# Patient Record
Sex: MALE | Race: WHITE | NOT HISPANIC OR LATINO | Employment: UNEMPLOYED | URBAN - METROPOLITAN AREA
[De-identification: names, ages, dates, MRNs, and addresses within clinical notes are randomized per-mention and may not be internally consistent; named-entity substitution may affect disease eponyms.]

---

## 2017-06-06 RX ORDER — CITALOPRAM 20 MG/1
TABLET, FILM COATED ORAL
Qty: 90 TABLET | Refills: 0 | Status: SHIPPED | OUTPATIENT
Start: 2017-06-06 | End: 2018-01-03

## 2017-06-07 ENCOUNTER — TELEPHONE (OUTPATIENT)
Dept: FAMILY MEDICINE | Facility: CLINIC | Age: 52
End: 2017-06-07

## 2017-06-07 NOTE — TELEPHONE ENCOUNTER
Upon review, pt has not been seen since 4/4/16. As of 7/22/16 pt's wife was informed that we do not accept new Medicaid(See phone note) and she stated understanding and will find a new provider. Also, Dr Alejandra approved the Citalopram on 6/5/17 for a one time refill, indicating the pt needs an appt. Msg was left by nurse for pt to call back and schedule appt. Will confirm that it is OK to continue seeing pt even if he has Medicaid. Also, pt's Involvement in Care form is a [facility] form. Awaiting response from mgmt to find out if we can honor this, or if we need to get pt to sign an [facility] Involvement in Care. Will call pt/wife once we know who we can communicate with.

## 2017-06-07 NOTE — TELEPHONE ENCOUNTER
----- Message from Lynnette Allen sent at 6/7/2017 12:23 PM CDT -----  Wife (Danielle)calling nurse concerning refill of patient's medication: Citalopram 20 mg/stated that [facility] informed them that patient needs to speak with doctor/no refills/please call back at [phone] (cellphone)to advise. (patient is out of medication)

## 2017-06-13 NOTE — TELEPHONE ENCOUNTER
----- Message from Madeline Caldwell sent at 6/13/2017  8:28 AM CDT -----  Patient is returning office call. Please call back with details at [phone].

## 2017-06-13 NOTE — TELEPHONE ENCOUNTER
According to our mgmt, the [facility] Involvement in Care form cannot be honored by [facility]. Called and left msg at number below for pt to call back. Will discuss in detail the refill protocol and options for PCP/appt.

## 2017-10-03 DIAGNOSIS — F41.9 ANXIETY: ICD-10-CM

## 2017-10-03 RX ORDER — CITALOPRAM 20 MG/1
TABLET, FILM COATED ORAL
Qty: 90 TABLET | Refills: 0 | OUTPATIENT
Start: 2017-10-03

## 2017-10-03 RX ORDER — ALPRAZOLAM 0.5 MG/1
TABLET ORAL
Qty: 30 TABLET | Refills: 0 | OUTPATIENT
Start: 2017-10-03

## 2017-10-04 ENCOUNTER — TELEPHONE (OUTPATIENT)
Dept: FAMILY MEDICINE | Facility: CLINIC | Age: 52
End: 2017-10-04

## 2017-10-04 NOTE — TELEPHONE ENCOUNTER
Unable to refill pt meds. Pt has been advised via previous refill requests that he needs appt. They have been advised that the visit would cost around $150(01099). Spoke w/ pt's wife and just advised her that pt needs appt prior to any refills due to time lapse since last visit. She will have pt call for appt. She is aware pt needs [facility] form signed in order to discuss any care w/ anyone other than pt.

## 2017-10-04 NOTE — TELEPHONE ENCOUNTER
----- Message from Nayeli Marquez sent at 10/4/2017 10:00 AM CDT -----  Contact: Wife, Jessica Rebolledo, wife of patient Jcarlos Rebolledo, [phone] is calling regarding refill on alprazolam (XANAX) 0.5 MG tablet AND citalopram (CELEXA) 20 MG tablet.  Patient understand that Dr. Alejandra wants to see him, but patient has no insurance at this time, but will have insurance beginning January 1, 2018.  Patient would like to know if Rx can be refilled through the end of the year until he has insurance again and can make an appointment.  Please advise.  Thanks!

## 2017-11-03 DIAGNOSIS — Z12.11 COLON CANCER SCREENING: ICD-10-CM

## 2017-11-06 ENCOUNTER — PATIENT OUTREACH (OUTPATIENT)
Dept: ADMINISTRATIVE | Facility: HOSPITAL | Age: 52
End: 2017-11-06

## 2017-11-06 NOTE — LETTER
November 6, 2017    Jcarlos Marksughman  [address]  [place] [address]             [facility]  [address]  [place] [address]  Phone: [phone] Dear Mr. Rebolledo:    [facility] is committed to your overall health.  To help you get the most out of each of your visits, we will review your information to make sure you are up to date on all of your recommended tests and/or procedures.      Dr. Evan Alejandra has found that your chart shows you may be due for hepatitis C screening, colon cancer screening, and a flu immunization.     If you have had any of the above done at another facility, please bring the records or information with you so that your record at [facility] will be complete.  If you would like to schedule any of these, please contact me.    If you are currently taking medication, please bring it with you to your appointment for review.    If you have any questions or concerns, please don't hesitate to call.    Thank you for letting us care for you,  Isabel Parker LPN Clinical Care Coordinator  [facility] [place]  [phone]

## 2017-12-20 ENCOUNTER — PATIENT OUTREACH (OUTPATIENT)
Dept: ADMINISTRATIVE | Facility: HOSPITAL | Age: 52
End: 2017-12-20

## 2017-12-20 NOTE — LETTER
December 20, 2017    Jcarlos Marksughman  [address]  [place] [address]             [facility]  [address]  [place] [address]  Phone: [phone] Dear Mr. Rebolledo:    [facility] is committed to your overall health.  To help you get the most out of each of your visits, we will review your information to make sure you are up to date on all of your recommended tests and/or procedures.      Dr. Alejandra has found that your chart shows you may be due for One time Hepatitis C screening lab test ( a viral condition that harms the liver), colon cancer screening, flu vaccine.     If you have had any of the above done at another facility, please bring the records or information with you so that your record at [facility] will be complete.  If you would like to schedule any of these, please contact me.    If you are currently taking medication, please bring it with you to your appointment for review.          If you have any questions or concerns, please don't hesitate to call.    Sincerely,        Herbert Bell LPN Clinical Care Coordinator  [facility] [address] [facility] [address]  [place] [address]  [phone] (p)   [phone] (f)

## 2018-01-03 ENCOUNTER — OFFICE VISIT (OUTPATIENT)
Dept: FAMILY MEDICINE | Facility: CLINIC | Age: 53
End: 2018-01-03
Payer: COMMERCIAL

## 2018-01-03 VITALS
HEART RATE: 70 BPM | WEIGHT: 222 LBS | TEMPERATURE: 98 F | BODY MASS INDEX: 31.08 KG/M2 | HEIGHT: 71 IN | DIASTOLIC BLOOD PRESSURE: 88 MMHG | SYSTOLIC BLOOD PRESSURE: 160 MMHG

## 2018-01-03 DIAGNOSIS — F41.9 ANXIETY: Primary | ICD-10-CM

## 2018-01-03 DIAGNOSIS — I10 ESSENTIAL HYPERTENSION: ICD-10-CM

## 2018-01-03 DIAGNOSIS — S49.92XA INJURY OF LEFT SHOULDER, INITIAL ENCOUNTER: ICD-10-CM

## 2018-01-03 PROCEDURE — 99214 OFFICE O/P EST MOD 30 MIN: CPT | Mod: S$GLB,,, | Performed by: FAMILY MEDICINE

## 2018-01-03 PROCEDURE — 99999 PR PBB SHADOW E&M-EST. PATIENT-LVL IV: CPT | Mod: PBBFAC,,, | Performed by: FAMILY MEDICINE

## 2018-01-03 RX ORDER — ALPRAZOLAM 0.5 MG/1
0.5 TABLET ORAL NIGHTLY PRN
Qty: 30 TABLET | Refills: 2 | Status: SHIPPED | OUTPATIENT
Start: 2018-01-03 | End: 2018-05-04 | Stop reason: SDUPTHER

## 2018-01-03 NOTE — PATIENT INSTRUCTIONS
Please call [phone] to schedule an appointment with Orthopedics.    Measure blood pressure weekly. Write down the readings. Decrease salt and calories. Eat foods that are high in potassium.   Eating Heart-Healthy Food: Using the DASH Plan    Eating for your heart doesnt have to be hard or boring. You just need to know how to make healthier choices. The DASH eating plan has been developed to help you do just that. DASH stands for Dietary Approaches to Stop Hypertension. It is a plan that has been proven to be healthier for your heart and to lower your risk for high blood pressure. It can also help lower your risk for cancer, heart disease, osteoporosis, and diabetes.  Choosing from each food group  Choose foods from each of the food groups below each day. Try to get the recommended number of servings for each food group. The serving numbers are based on a diet of 2,000 calories a day. Talk to your doctor if youre unsure about your calorie needs. Along with getting the correct servings, the DASH plan also recommends a sodium intake less than 2,300 mg per day.        Grains  Servings: 6 to 8 a day  A serving is:  · 1 slice bread  · 1 ounce dry cereal  · Half a cup cooked rice, pasta or cereal  Best choices: Whole grains and any grains high in fiber. Vegetables  Servings: 4 to 5 a day  A serving is:  · 1 cup raw leafy vegetable  · Half a cup cut-up raw or cooked vegetable  · Half a cup vegetable juice  Best choices: Fresh or frozen vegetables prepared without added salt or fat.   Fruits  Servings: 4 to 5 a day  A serving is:  · 1 medium fruit  · One-quarter cup dried fruit  · Half a cup fresh, frozen, or canned fruit  · Half a cup of 100% fruit juices  Best choices: A variety of fresh fruits of different colors. Whole fruits are a better choice than fruit juices. Low-fat or fat-free dairy  Servings: 2 to 3 a day  A serving is:  · 1 cup milk  · 1 cup yogurt  · One and a half ounces cheese  Best choices: Skim or 1%  milk, low-fat or fat-free yogurt or buttermilk, and low-fat cheeses.         Lean meats, poultry, fish  Servings: 6 or fewer a day  A serving is:  · 1 ounce cooked meats, poultry, or fish  · 1 egg  Best choices: Lean poultry and fish. Trim away visible fat. Broil, grill, roast, or boil instead of frying. Remove skin from poultry before eating. Limit how much red meat you eat.  Nuts, seeds, beans  Servings: 4 to 5 a week  A serving is:  · One-third cup nuts (one and a half ounces)  · 2 tablespoons nut butter or seeds  · Half a cup cooked dry beans or legumes  Best choices: Dry roasted nuts with no salt added, lentils, kidney beans, garbanzo beans, and whole aguilar beans.   Fats and oils  Servings: 2 to 3 a day  A serving is:  · 1 teaspoon vegetable oil  · 1 teaspoon soft margarine  · 1 tablespoon mayonnaise  · 2 tablespoons salad dressing  Best choices: Nut and vegetable oils (nontropical vegetable oils), such as olive and canola oil. Sweets  Servings: 5 a week or fewer  A serving is:  · 1 tablespoon sugar, maple syrup, or honey  · 1 tablespoon jam or jelly  · 1 half-ounce jelly beans (about 15)  · 1 cup lemonade  Best choices: Dried fruit can be a satisfying sweet. Choose low-fat sweets. And watch your serving sizes!      For more on the DASH eating plan, visit:  www.nhlbi.nih.gov/health/health-topics/topics/dash   Date Last Reviewed: 6/1/2016  © 4163-7568 [employer]. [address] [place] [address], [place] [address]. All rights reserved. This information is not intended as a substitute for professional medical care. Always follow your healthcare professional's instructions.

## 2018-01-03 NOTE — PROGRESS NOTES
"Subjective:       Patient ID: Jcarlos Rebolledo is a 52 y.o. male.    Chief Complaint: Follow-up (Discuss med refills. Pt has not taken Celexa 40mg nor Xanax 0.5mg in over 6 mo.)    He is here for follow-up of anxiety.  He has been taking Celexa off and on since [place].  He had tapered off one or 2 years ago.  He resumed temporarily in June 2017 during the stress of a job change.  He was more tran and irritable.  Currently he feels that his emotions are in good control.  He likes to take Xanax 1-2 times a week for insomnia.  He sometimes works the night shift.  It is hard for him to turn off his mind.  He has had some elevated blood pressure readings over the past few years.  He says that his blood pressure has been better than this at home.  He does consume a lot of salty food.  2-3 alcoholic beverages every few weeks.  He did take a sinus medication yesterday.  One to 2 cups of coffee a day.  No family history of hypertension        Review of Systems   Constitutional: Negative for fatigue, fever and unexpected weight change.   HENT: Negative.    Eyes: Negative for visual disturbance.   Respiratory: Negative for cough, shortness of breath and wheezing.    Cardiovascular: Negative for chest pain, palpitations and leg swelling.   Gastrointestinal: Negative for abdominal pain, blood in stool and diarrhea.   Genitourinary: Negative for difficulty urinating and hematuria.   Musculoskeletal:        Left shoulder pain.  Painful to lay on the left side.  He fell backwards and braced himself with his arms about 6 weeks ago.  Painful to raise his arm past horizontal.   Skin:        No neoplasms    Neurological: Negative for weakness and numbness.       Objective:     Blood pressure (!) 160/88, pulse 70, temperature 98.1 °F (36.7 °C), temperature source Oral, height 5' 11" (1.803 m), weight 100.7 kg (222 lb 0.1 oz).      Physical Exam   Constitutional: He appears well-developed and well-nourished.   No distress   HENT: "   Nose clear. TM's wnl. No oral lesions.    Eyes: Conjunctivae and EOM are normal. Pupils are equal, round, and reactive to light.   Neck: Normal range of motion. No thyromegaly present.   Cardiovascular: Normal rate, regular rhythm, normal heart sounds and intact distal pulses.    No murmur heard.  No carotid bruits   Pulmonary/Chest: Effort normal and breath sounds normal. He has no wheezes. He has no rales.   Abdominal: Soft. Bowel sounds are normal. He exhibits no mass. There is no tenderness.   Musculoskeletal: He exhibits no edema.   He lacks a little internal rotation and neck sternal rotation of the left shoulder.  Pain with active extension and abduction past horizontal but he can go further.  There is a slight clunk.  I was unable to sublux his humerus anteriorly.  Mildly tender acromioclavicular joint.  Mild pain with resisted supraspinatus and internal rotator.   Lymphadenopathy:     He has no cervical adenopathy.   Neurological: He is alert.   Skin:   No rash or lesions       Assessment:       1. Anxiety    2. Injury of left shoulder, initial encounter    3. Essential hypertension        Plan:       Left shoulder x-ray and orthopedic consult because he has not improved over the past 6 weeks.  Monitor blood pressure and write down readings.  Return to clinic in 3 months with lab work prior.  Refilled Xanax.  Stay off Celexa.

## 2018-01-10 ENCOUNTER — OFFICE VISIT (OUTPATIENT)
Dept: FAMILY MEDICINE | Facility: CLINIC | Age: 53
End: 2018-01-10
Payer: COMMERCIAL

## 2018-01-10 VITALS
WEIGHT: 219.94 LBS | BODY MASS INDEX: 30.79 KG/M2 | TEMPERATURE: 99 F | DIASTOLIC BLOOD PRESSURE: 80 MMHG | HEART RATE: 74 BPM | HEIGHT: 71 IN | SYSTOLIC BLOOD PRESSURE: 138 MMHG | OXYGEN SATURATION: 98 %

## 2018-01-10 DIAGNOSIS — J01.90 ACUTE NON-RECURRENT SINUSITIS, UNSPECIFIED LOCATION: Primary | ICD-10-CM

## 2018-01-10 DIAGNOSIS — J40 BRONCHITIS: ICD-10-CM

## 2018-01-10 DIAGNOSIS — J02.9 PHARYNGITIS, UNSPECIFIED ETIOLOGY: ICD-10-CM

## 2018-01-10 DIAGNOSIS — H60.91 OTITIS EXTERNA OF RIGHT EAR, UNSPECIFIED CHRONICITY, UNSPECIFIED TYPE: ICD-10-CM

## 2018-01-10 DIAGNOSIS — H66.91 RIGHT OTITIS MEDIA, UNSPECIFIED OTITIS MEDIA TYPE: ICD-10-CM

## 2018-01-10 DIAGNOSIS — R04.0 EPISTAXIS: ICD-10-CM

## 2018-01-10 LAB
CTP QC/QA: YES
S PYO RRNA THROAT QL PROBE: NEGATIVE

## 2018-01-10 PROCEDURE — 87880 STREP A ASSAY W/OPTIC: CPT | Mod: QW,S$GLB,, | Performed by: INTERNAL MEDICINE

## 2018-01-10 PROCEDURE — 96372 THER/PROPH/DIAG INJ SC/IM: CPT | Mod: S$GLB,,, | Performed by: INTERNAL MEDICINE

## 2018-01-10 PROCEDURE — 99213 OFFICE O/P EST LOW 20 MIN: CPT | Mod: 25,S$GLB,, | Performed by: INTERNAL MEDICINE

## 2018-01-10 PROCEDURE — 99999 PR PBB SHADOW E&M-EST. PATIENT-LVL III: CPT | Mod: PBBFAC,,, | Performed by: INTERNAL MEDICINE

## 2018-01-10 RX ORDER — BETAMETHASONE SODIUM PHOSPHATE AND BETAMETHASONE ACETATE 3; 3 MG/ML; MG/ML
6 INJECTION, SUSPENSION INTRA-ARTICULAR; INTRALESIONAL; INTRAMUSCULAR; SOFT TISSUE
Status: COMPLETED | OUTPATIENT
Start: 2018-01-10 | End: 2018-01-10

## 2018-01-10 RX ORDER — AMOXICILLIN AND CLAVULANATE POTASSIUM 875; 125 MG/1; MG/1
1 TABLET, FILM COATED ORAL EVERY 12 HOURS
Qty: 20 TABLET | Refills: 0 | Status: SHIPPED | OUTPATIENT
Start: 2018-01-10 | End: 2018-06-18

## 2018-01-10 RX ORDER — NEOMYCIN SULFATE, POLYMYXIN B SULFATE AND HYDROCORTISONE 10; 3.5; 1 MG/ML; MG/ML; [USP'U]/ML
3 SUSPENSION/ DROPS AURICULAR (OTIC) 3 TIMES DAILY
Qty: 10 ML | Refills: 0 | Status: SHIPPED | OUTPATIENT
Start: 2018-01-10 | End: 2018-06-18

## 2018-01-10 RX ADMIN — BETAMETHASONE SODIUM PHOSPHATE AND BETAMETHASONE ACETATE 6 MG: 3; 3 INJECTION, SUSPENSION INTRA-ARTICULAR; INTRALESIONAL; INTRAMUSCULAR; SOFT TISSUE at 10:01

## 2018-01-10 NOTE — PROGRESS NOTES
"Subjective:       Patient ID: Jcarlos Rebolledo is a 52 y.o. male.    Chief Complaint: Sore Throat (x 2 days ); Cough (x 2 days with greenish mucus ); and Fever    HPI  Seeing pt today for Dr Alejandra his PCP. Pt w sore throat for 2-3 days; nasal congestion; no nasal phlegm noted but min epistaxis noted R nose today; cough w green mucus for 2 days. Fever last night subjective; 99.0 here today. OTC advil and sudafed; no real benefit. Facial pressure mild. No myalgias or HA's. No fatigue. Hx seasonal allergies w no recent flare-up noted. Mild epistaxis R side today noted min on tissue today.    Review of Systems   Constitutional: Positive for fever. Negative for chills, diaphoresis and fatigue.   HENT: Positive for congestion, nosebleeds, postnasal drip, rhinorrhea, sinus pressure and sore throat.    Eyes:        Some eye tearing.   Respiratory: Positive for cough. Negative for chest tightness, shortness of breath and wheezing.    Cardiovascular: Negative for chest pain, palpitations and leg swelling.   Gastrointestinal: Negative for abdominal pain, diarrhea, nausea and vomiting.   Genitourinary: Negative for dysuria and hematuria.   Musculoskeletal: Negative for arthralgias and myalgias.   Skin: Negative for rash.   Allergic/Immunologic: Negative for environmental allergies and food allergies.   Neurological: Negative for dizziness, light-headedness and headaches.       Objective:      Vitals:    01/10/18 0851   BP: 138/80   BP Location: Left arm   Patient Position: Sitting   BP Method: Medium (Manual)   Pulse: 74   Temp: 99 °F (37.2 °C)   TempSrc: Oral   SpO2: 98%   Weight: 99.7 kg (219 lb 14.5 oz)   Height: 5' 11" (1.803 m)     Body mass index is 30.67 kg/m².    Physical Exam   HENT:   R EC inflamed and TM; NM swollen/inflamed; clear mucus. No epistaxis clearly seen. Throat/post phartnx inflamed. No sinus tenderness to palp.       Assessment:       1. Acute non-recurrent sinusitis, unspecified location    2. Bronchitis "    3. Pharyngitis, unspecified etiology    4. Epistaxis    5. Right otitis media, unspecified otitis media type    6. Otitis externa of right ear, unspecified chronicity, unspecified type        Plan:       Acute non-recurrent sinusitis, unspecified location; augmentin 875 mg q 12 hrs for 10 days. Warm salt water gargle 1-3x a day as needed         claritin 10 mg a day as needed for congestion.   -     POCT Influenza A/B  -     POCT Rapid Strep A  -     betamethasone acetate-betamethasone sodium phosphate injection 6 mg; Inject 1 mL (6 mg total) into the muscle one time.    Bronchitis; mucinex DM for cough and congetion; augmentin as above.  -     POCT Influenza A/B  -     POCT Rapid Strep A  -     betamethasone acetate-betamethasone sodium phosphate injection 6 mg; Inject 1 mL (6 mg total) into the muscle one time.    Pharyngitis, unspecified etiology; chloraseptic as needed for sore throat. Salt water gargle prn.   -     POCT Influenza A/B  -     POCT Rapid Strep A  -     betamethasone acetate-betamethasone sodium phosphate injection 6 mg; Inject 1 mL (6 mg total) into the muscle one time.    Epistaxis; saline nasal as needed; afrin nasal max 3 days as needed for nosebleed; see ENT of his choice if persists w nosebleeds.        Recommend dr Jones.  -     POCT Influenza A/B  -     POCT Rapid Strep A  -     betamethasone acetate-betamethasone sodium phosphate injection 6 mg; Inject 1 mL (6 mg total) into the muscle one time.    R otitis media w ext otitis; cortisporin otic hc susp 3 gtts R ear canal TID for 1 week; no Q-tips to be used in his ear canals.

## 2018-01-10 NOTE — PATIENT INSTRUCTIONS
Acute non-recurrent sinusitis, unspecified location; augmentin 875 mg q 12 hrs for 10 days. Warm salt water gargle 1-3x a day as needed         claritin 10 mg a day as needed for congestion.   -     POCT Influenza A/B  -     POCT Rapid Strep A  -     betamethasone acetate-betamethasone sodium phosphate injection 6 mg; Inject 1 mL (6 mg total) into the muscle one time.    Bronchitis; mucinex DM for cough and congetion; augmentin as above.  -     POCT Influenza A/B  -     POCT Rapid Strep A  -     betamethasone acetate-betamethasone sodium phosphate injection 6 mg; Inject 1 mL (6 mg total) into the muscle one time.    Pharyngitis, unspecified etiology; chloraseptic as needed for sore throat. Salt water gargle prn.   -     POCT Influenza A/B  -     POCT Rapid Strep A  -     betamethasone acetate-betamethasone sodium phosphate injection 6 mg; Inject 1 mL (6 mg total) into the muscle one time.    Epistaxis; saline nasal as needed; afrin nasal max 3 days as needed for nosebleed; see ENT of his choice if persists w nosebleeds.        Recommend dr Jones.  -     POCT Influenza A/B  -     POCT Rapid Strep A  -     betamethasone acetate-betamethasone sodium phosphate injection 6 mg; Inject 1 mL (6 mg total) into the muscle one time.    R otitis media w ext otitis; cortisporin otic hc susp 3 gtts R ear canal TID for 1 week; no Q-tips to be used in his ear canals.

## 2018-05-04 DIAGNOSIS — F41.9 ANXIETY: ICD-10-CM

## 2018-05-04 RX ORDER — ALPRAZOLAM 0.5 MG/1
TABLET ORAL
Qty: 30 TABLET | Refills: 2 | Status: SHIPPED | OUTPATIENT
Start: 2018-05-04 | End: 2018-06-07 | Stop reason: SDUPTHER

## 2018-06-06 ENCOUNTER — TELEPHONE (OUTPATIENT)
Dept: FAMILY MEDICINE | Facility: CLINIC | Age: 53
End: 2018-06-06

## 2018-06-06 DIAGNOSIS — F41.9 ANXIETY: ICD-10-CM

## 2018-06-06 NOTE — TELEPHONE ENCOUNTER
----- Message from Sharla Crum sent at 6/6/2018  9:16 AM CDT -----  Contact: Patient's wife, Jessica  Patient's wife is calling to speak with someone because the patient would like to start taking the anxiety medication again that he was taking.  The doctor told him if he ever wanted to get back on it to let him know.  Please call to discuss.  Call Back#[phone]  Thanks     [facility]  [place]  [address]  [address]  [place] [address]  Phone: [phone] Fax: [phone]

## 2018-06-06 NOTE — TELEPHONE ENCOUNTER
Dr Alejandra sent rx for Alprazolam to [facility] on 5/4/18. No Involvement in Care form signed. Spoke w/ pt and advised this was done. He states he was taking something else in the past with this but did not know the name. The wife said it was Celexa. Upon review and discussion w/ pt and wife, pt d/c'd Celexa in Jan and has been taking this med off/on x many years. Advised pt that he may not need an appt for a refill and that we would ask Dr Alejandra. Pt is aware Dr Alejandra is out of the office until 6/13/18 and we would contact him once addressed. Please review and advise.

## 2018-06-07 RX ORDER — ALPRAZOLAM 0.5 MG/1
TABLET ORAL
Qty: 30 TABLET | Refills: 0 | Status: SHIPPED | OUTPATIENT
Start: 2018-06-07 | End: 2019-06-04 | Stop reason: SDUPTHER

## 2018-06-11 ENCOUNTER — LAB VISIT (OUTPATIENT)
Dept: LAB | Facility: HOSPITAL | Age: 53
End: 2018-06-11
Attending: FAMILY MEDICINE
Payer: COMMERCIAL

## 2018-06-11 DIAGNOSIS — I10 ESSENTIAL HYPERTENSION: ICD-10-CM

## 2018-06-11 LAB
ALBUMIN SERPL BCP-MCNC: 4.1 G/DL
ALP SERPL-CCNC: 61 U/L
ALT SERPL W/O P-5'-P-CCNC: 16 U/L
ANION GAP SERPL CALC-SCNC: 9 MMOL/L
AST SERPL-CCNC: 12 U/L
BILIRUB SERPL-MCNC: 1.5 MG/DL
BUN SERPL-MCNC: 16 MG/DL
CALCIUM SERPL-MCNC: 9.7 MG/DL
CHLORIDE SERPL-SCNC: 104 MMOL/L
CHOLEST SERPL-MCNC: 130 MG/DL
CHOLEST/HDLC SERPL: 2.8 {RATIO}
CO2 SERPL-SCNC: 28 MMOL/L
CREAT SERPL-MCNC: 1.1 MG/DL
ERYTHROCYTE [DISTWIDTH] IN BLOOD BY AUTOMATED COUNT: 13.4 %
EST. GFR  (AFRICAN AMERICAN): >60 ML/MIN/1.73 M^2
EST. GFR  (NON AFRICAN AMERICAN): >60 ML/MIN/1.73 M^2
GLUCOSE SERPL-MCNC: 119 MG/DL
HCT VFR BLD AUTO: 48.5 %
HDLC SERPL-MCNC: 47 MG/DL
HDLC SERPL: 36.2 %
HGB BLD-MCNC: 16.1 G/DL
LDLC SERPL CALC-MCNC: 73.2 MG/DL
MCH RBC QN AUTO: 29.8 PG
MCHC RBC AUTO-ENTMCNC: 33.2 G/DL
MCV RBC AUTO: 90 FL
NONHDLC SERPL-MCNC: 83 MG/DL
PLATELET # BLD AUTO: 292 K/UL
PMV BLD AUTO: 11.3 FL
POTASSIUM SERPL-SCNC: 4.2 MMOL/L
PROT SERPL-MCNC: 6.8 G/DL
RBC # BLD AUTO: 5.41 M/UL
SODIUM SERPL-SCNC: 141 MMOL/L
TRIGL SERPL-MCNC: 49 MG/DL
WBC # BLD AUTO: 5.68 K/UL

## 2018-06-11 PROCEDURE — 36415 COLL VENOUS BLD VENIPUNCTURE: CPT | Mod: PN

## 2018-06-11 PROCEDURE — 80061 LIPID PANEL: CPT

## 2018-06-11 PROCEDURE — 80053 COMPREHEN METABOLIC PANEL: CPT

## 2018-06-11 PROCEDURE — 85027 COMPLETE CBC AUTOMATED: CPT

## 2018-06-18 ENCOUNTER — OFFICE VISIT (OUTPATIENT)
Dept: FAMILY MEDICINE | Facility: CLINIC | Age: 53
End: 2018-06-18
Payer: COMMERCIAL

## 2018-06-18 VITALS
HEART RATE: 62 BPM | WEIGHT: 206.69 LBS | DIASTOLIC BLOOD PRESSURE: 74 MMHG | OXYGEN SATURATION: 98 % | TEMPERATURE: 98 F | SYSTOLIC BLOOD PRESSURE: 126 MMHG | BODY MASS INDEX: 28.94 KG/M2 | HEIGHT: 71 IN

## 2018-06-18 DIAGNOSIS — F41.9 ANXIETY: Primary | ICD-10-CM

## 2018-06-18 DIAGNOSIS — R03.0 ELEVATED BLOOD PRESSURE READING: ICD-10-CM

## 2018-06-18 PROCEDURE — 99999 PR PBB SHADOW E&M-EST. PATIENT-LVL IV: CPT | Mod: PBBFAC,,, | Performed by: FAMILY MEDICINE

## 2018-06-18 PROCEDURE — 99214 OFFICE O/P EST MOD 30 MIN: CPT | Mod: S$GLB,,, | Performed by: FAMILY MEDICINE

## 2018-06-18 RX ORDER — CITALOPRAM 20 MG/1
40 TABLET, FILM COATED ORAL DAILY
Qty: 180 TABLET | Refills: 1 | Status: SHIPPED | OUTPATIENT
Start: 2018-06-18 | End: 2018-06-27 | Stop reason: ALTCHOICE

## 2018-06-18 RX ORDER — ASPIRIN 81 MG/1
81 TABLET ORAL DAILY
COMMUNITY
End: 2021-03-11 | Stop reason: CLARIF

## 2018-06-18 NOTE — PROGRESS NOTES
Subjective:       Patient ID: Jcarlos Rebolledo is a 52 y.o. male.    Chief Complaint: Follow-up (review of labs) and Medication Refill (pt would like to be put back on Celexa 40 mg)    HPI   Jcarlos Rebolledo is a 51yo male who presents today for follow-up of his labs and for management of his anxiety. He was laid off from work about 3 weeks ago and has been experiencing a lot of stress and anxiety since then. He reports feeling anxious frequently. He has been having racing thoughts and difficulty with falling asleep. He has been taking 1/2 a tablet of xanax 3-4x a week to help with his sleep. This has assisted him in getting to sleep at night. He also reports occasional chest tightness when the racing thoughts come on. He denies any associated chest pain, dyspnea, diaphoresis or nausea. He has also been pacing, had a decreased appetite, and lost about 5 pounds since his anxiety has started.  Of note, he reports that he was on celexa 40mg approximately 2 years ago with good relief of his anxiety. He was having similar symptoms at that time. He denies any side effects with using celexa in the past.     Review of Systems   Constitutional: Positive for appetite change and fatigue. Negative for activity change, chills, fever and unexpected weight change.   HENT: Negative for congestion and rhinorrhea.    Eyes: Negative for visual disturbance.   Respiratory: Positive for chest tightness. Negative for cough and shortness of breath.    Cardiovascular: Negative for chest pain, palpitations and leg swelling.   Gastrointestinal: Negative for abdominal pain, blood in stool, constipation, diarrhea and nausea.   Genitourinary: Negative for difficulty urinating and frequency.   Musculoskeletal: Negative for arthralgias and myalgias.   Neurological: Negative for dizziness, weakness, light-headedness, numbness and headaches.   Psychiatric/Behavioral: Positive for sleep disturbance. The patient is nervous/anxious.        Objective:        Vitals:    06/18/18 1542   BP: (!) 146/80   Pulse: 62   Temp: 98 °F (36.7 °C)       Physical Exam   Constitutional: He is oriented to person, place, and time. He appears well-developed and well-nourished. No distress.   HENT:   Head: Normocephalic and atraumatic.   Mouth/Throat: Oropharynx is clear and moist.   Neck: Normal range of motion. Neck supple.   Cardiovascular: Normal rate, regular rhythm, normal heart sounds and intact distal pulses.    Pulmonary/Chest: Effort normal and breath sounds normal. He has no wheezes. He has no rales.   Musculoskeletal: He exhibits no edema or deformity.   Lymphadenopathy:     He has no cervical adenopathy.   Neurological: He is alert and oriented to person, place, and time.   Skin: Skin is warm and dry. He is not diaphoretic. No erythema.       Assessment:       1. Anxiety    2. Elevated blood pressure reading        Plan:       1. Anxiety  Patient with onset of anxiety since recent life stressors. He has been on celexa with good relief in the past and without side effects.  Will re-start celexa with 10mg for 4 days, increase to 20mg after that. Will titrate up as needed based on patient response.   E-mail message in 4-5 weeks with progress.  - citalopram (CELEXA) 20 MG tablet; Take 2 tablets (40 mg total) by mouth once daily.  Dispense: 180 tablet; Refill: 1    2. Elevated blood pressure reading  Patient with blood pressure of 146/80. He reports feeling anxious today and when coming to the doctor's office.  He has been keeping a log at home with readings in the 120s/70s.   Continue home blood pressure log.

## 2018-06-18 NOTE — PATIENT INSTRUCTIONS
For first 4 days take 1/2 tablet with dinner then increase to 1 tablet (20mg) a day.   Send a [facility] message to Dr. Alejandra in 5 weeks with an update on how the medication is working and if you are having side effects.

## 2018-06-25 ENCOUNTER — TELEPHONE (OUTPATIENT)
Dept: FAMILY MEDICINE | Facility: CLINIC | Age: 53
End: 2018-06-25

## 2018-06-25 NOTE — TELEPHONE ENCOUNTER
----- Message from Ruth Ann Ray sent at 6/25/2018 11:09 AM CDT -----  Contact: Jessica Rebolledo (Spouse)  Jessica Rebolledo (Spouse) calling would like to speak to the nurse regarding pt saw Dr on 6/18 and prescribed the pt citalopram (CELEXA) 20 MG tablet and it is not working,the Dr told him if not working to call the office and he would call in a higher dose,so that is what she is doing....[phone]      .  [facility] [place] - [address]  [address]  [place] [address]  Phone: [phone] Fax: [phone]

## 2018-06-27 ENCOUNTER — TELEPHONE (OUTPATIENT)
Dept: FAMILY MEDICINE | Facility: CLINIC | Age: 53
End: 2018-06-27

## 2018-06-27 RX ORDER — SERTRALINE HYDROCHLORIDE 100 MG/1
100 TABLET, FILM COATED ORAL DAILY
Qty: 30 TABLET | Refills: 1 | Status: SHIPPED | OUTPATIENT
Start: 2018-06-27 | End: 2018-08-30 | Stop reason: ALTCHOICE

## 2018-06-27 NOTE — TELEPHONE ENCOUNTER
----- Message from Linsey Cerna sent at 6/27/2018  3:48 PM CDT -----  Contact: Jessica  Patient's wife is calling as with increased dosage of Rx citalopram (CELEXA) 20 MG tablet to the 40 mg total patient is not doing well; jittery, anxious, not sleeping, and not eating well. Asking to be switched to Rx Zoloft.     [facility] [place] - [address]  [address] [place] [address]  Phone: [phone] Fax: [phone]    Please call today to confirm [phone]. Thanks!

## 2018-06-29 ENCOUNTER — CLINICAL SUPPORT (OUTPATIENT)
Dept: OCCUPATIONAL MEDICINE | Facility: CLINIC | Age: 53
End: 2018-06-29

## 2018-06-29 DIAGNOSIS — Z02.83 ENCOUNTER FOR EMPLOYMENT-RELATED DRUG TESTING: Primary | ICD-10-CM

## 2018-06-29 LAB
CTP QC/QA: YES
POC 10 PANEL DRUG SCREEN: NEGATIVE

## 2018-06-29 PROCEDURE — 80305 DRUG TEST PRSMV DIR OPT OBS: CPT | Mod: QW,S$GLB,, | Performed by: FAMILY MEDICINE

## 2018-07-24 ENCOUNTER — TELEPHONE (OUTPATIENT)
Dept: FAMILY MEDICINE | Facility: CLINIC | Age: 53
End: 2018-07-24

## 2018-07-24 NOTE — TELEPHONE ENCOUNTER
----- Message from Selena Caro sent at 7/24/2018  2:56 PM CDT -----  [facility] a nurse regarding changing the dosage on celexa please call back at     [facility] [place] - [address]  [place] [address]  Phone: [phone] Fax: [phone]

## 2018-08-06 ENCOUNTER — TELEPHONE (OUTPATIENT)
Dept: FAMILY MEDICINE | Facility: CLINIC | Age: 53
End: 2018-08-06

## 2018-08-06 NOTE — TELEPHONE ENCOUNTER
Try taking with supper. You can try half a tablet to see if a lower dose causes less side effects.

## 2018-08-06 NOTE — TELEPHONE ENCOUNTER
----- Message from Viola Tillman sent at 8/6/2018  9:41 AM CDT -----  Please call pt 's Jessica / [phone] ... Asking to discuss with nurse / his dosage for zoloft / takes at night but is tired and fatigued all night / asking if dosage can be changed

## 2018-08-06 NOTE — TELEPHONE ENCOUNTER
Jessica was advised about giving the medication at supper and only 1/2 a tab. She states understanding and will call back if she needs further assistance.

## 2018-08-06 NOTE — TELEPHONE ENCOUNTER
Pt takes his zoloft at 10 pm  Pt states he is tired during the day and it keeps him up at night.  Pt wants to know should he take it earlier in the day?

## 2018-08-21 ENCOUNTER — TELEPHONE (OUTPATIENT)
Dept: FAMILY MEDICINE | Facility: CLINIC | Age: 53
End: 2018-08-21

## 2018-08-21 NOTE — TELEPHONE ENCOUNTER
----- Message from Selena Caro sent at 8/20/2018  1:17 PM CDT -----  Contact: Wife, Jessica Lopez want to speak with a nurse regarding scheduling patient appointment tomorrow morning for ozzie please call back at [phone]

## 2018-08-24 ENCOUNTER — TELEPHONE (OUTPATIENT)
Dept: FAMILY MEDICINE | Facility: CLINIC | Age: 53
End: 2018-08-24

## 2018-08-24 NOTE — TELEPHONE ENCOUNTER
----- Message from Michelle Lancaster sent at 8/24/2018  3:34 PM CDT -----  Contact: Wife  Jessica Rebolledo  Type:  Patient Returning Call    Who Called:  Wife Jessica Rebolledo  Who Left Message for Patient: AKYLA   Does the patient know what this is regarding?:        Best Call Back Number:  [phone]  Additional Information:

## 2018-08-28 ENCOUNTER — TELEPHONE (OUTPATIENT)
Dept: FAMILY MEDICINE | Facility: CLINIC | Age: 53
End: 2018-08-28

## 2018-08-28 NOTE — TELEPHONE ENCOUNTER
----- Message from Angie Sultana sent at 8/28/2018  1:51 PM CDT -----  Contact: Patient's Wife, Jessica Rebolledo  Patient's Wife, Jessica Rebolledo, called advising that her  is having lower back pain and urinary retention.  No available appointment came up to book.  Please call patient's wife back at [phone] to schedule.

## 2018-08-30 ENCOUNTER — OFFICE VISIT (OUTPATIENT)
Dept: FAMILY MEDICINE | Facility: CLINIC | Age: 53
End: 2018-08-30
Payer: MEDICAID

## 2018-08-30 VITALS
HEART RATE: 74 BPM | DIASTOLIC BLOOD PRESSURE: 88 MMHG | HEIGHT: 71 IN | TEMPERATURE: 99 F | WEIGHT: 199.94 LBS | SYSTOLIC BLOOD PRESSURE: 130 MMHG | BODY MASS INDEX: 27.99 KG/M2

## 2018-08-30 DIAGNOSIS — Z12.11 COLON CANCER SCREENING: ICD-10-CM

## 2018-08-30 DIAGNOSIS — M54.50 CHRONIC MIDLINE LOW BACK PAIN WITHOUT SCIATICA: ICD-10-CM

## 2018-08-30 DIAGNOSIS — G89.29 CHRONIC MIDLINE LOW BACK PAIN WITHOUT SCIATICA: ICD-10-CM

## 2018-08-30 DIAGNOSIS — K59.00 CONSTIPATION, UNSPECIFIED CONSTIPATION TYPE: ICD-10-CM

## 2018-08-30 DIAGNOSIS — F41.9 ANXIETY: Primary | ICD-10-CM

## 2018-08-30 PROCEDURE — 99999 PR PBB SHADOW E&M-EST. PATIENT-LVL III: CPT | Mod: PBBFAC,,, | Performed by: FAMILY MEDICINE

## 2018-08-30 PROCEDURE — 99214 OFFICE O/P EST MOD 30 MIN: CPT | Mod: S$PBB,,, | Performed by: FAMILY MEDICINE

## 2018-08-30 PROCEDURE — 99213 OFFICE O/P EST LOW 20 MIN: CPT | Mod: PBBFAC,PN | Performed by: FAMILY MEDICINE

## 2018-08-30 RX ORDER — ESCITALOPRAM OXALATE 10 MG/1
10 TABLET ORAL DAILY
Qty: 30 TABLET | Refills: 11 | Status: SHIPPED | OUTPATIENT
Start: 2018-08-30 | End: 2019-08-21 | Stop reason: SDUPTHER

## 2018-08-30 NOTE — PROGRESS NOTES
"Subjective:       Patient ID: Jcarlos Rebolledo is a 53 y.o. male.    Chief Complaint: Back Pain    He has had some lower back pain for the past 1 and half months.  It has improved.  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  It did not seem to bother him when he was walking.  No radiation down the legs.  No specific injury.  He was wondering if that had anything to do with his bowel movement change.  Over the past 2 months he has had looser bowels but every 2 days.  He does not have to strain.  There was no blood.  We talked about colon cancer screening.  He has a fear of elevators.  He is not sure that he can get in an elevator to get to the GI suite.  He has anxiety disorder with panic and obsessive worrying.  He was started on Celexa in June and increased to 40 mg.  He and his wife did not feel that his anxiety was controlled enough.  He was changed to Zoloft.  He defers to her and she says that he seems to be more nervous and fidgety.      Review of Systems   Constitutional: Negative for fever and unexpected weight change.   Respiratory: Negative for shortness of breath.    Cardiovascular: Negative for chest pain, palpitations and leg swelling.   Gastrointestinal: Positive for constipation. Negative for abdominal distention, abdominal pain and blood in stool.   Musculoskeletal: Positive for back pain.   Neurological: Negative for weakness and numbness.       Objective:     Blood pressure 130/88, pulse 74, temperature 99.2 °F (37.3 °C), height 5' 11" (1.803 m), weight 90.7 kg (199 lb 15.3 oz).      Physical Exam   Constitutional: He appears well-developed and well-nourished. No distress.   Cardiovascular: Normal rate, regular rhythm, normal heart sounds and intact distal pulses.   No murmur heard.  Pulmonary/Chest: Effort normal and breath sounds normal. No respiratory distress.   Abdominal: Soft. Bowel sounds are normal. He exhibits no distension and no mass. There is no tenderness. There is no " rebound.   Musculoskeletal:   He has good range of motion of his lumbar spine without pain. Some mild left sacroiliac tenderness. Straight leg raise negative.   Neurological: He is alert.   Patellar reflexes are 3+ and Achilles 2+.  Ankle strength intact.  Sensation to light touch is intact in the lower legs.   Psychiatric: He has a normal mood and affect.       Assessment:       1. Anxiety    2. Colon cancer screening    3. Chronic midline low back pain without sciatica    4. Constipation, unspecified constipation type        Plan:       I reviewed choices for colon cancer screening.  First he chose colonoscopy and then changed his mind for the fit kit.  We will monitor his bowel function.  He has taken some Advil for back pain.  I suggested some stretching exercises.  Changed to Lexapro 10 mg.  Return to clinic in 3 weeks.

## 2018-09-05 ENCOUNTER — PATIENT OUTREACH (OUTPATIENT)
Dept: ADMINISTRATIVE | Facility: HOSPITAL | Age: 53
End: 2018-09-05

## 2018-09-05 NOTE — LETTER
September 13, 2018    Jcarlos Rebolledo  [address]  [place] [address]             [facility]  [address]  [facility] [address]  Phone: [phone] Dear Mr. Rebolledo:    We have tried to reach you by mychart unsuccessfully.      [facility] is committed to your overall health.  To help you get the most out of each of your visits, we will review your information to make sure you are up to date on all of your recommended tests and or procedures.       Dr. Evan Alejandra MD has found that you may be due for:     One time Hepatitis C screening lab test ( a viral condition that harms the liver)   Colon cancer screening   Flu vaccine     If you have had any of the above done at another facility, please bring the records or information with you so that your record at [facility] will be complete and up to date.     If you have not had any of these tests or procedures done recently and would like to complete this testing before your appointment on 9/19/18 at 8:45 am with Evan Alejandra MD please call [phone] or send a message through your [facility] portal to your provider's office.     If you are currently taking medication, please bring it with you to your appointment for review.         Please feel free to call the number listed below if you have any questions.     Thanks,     Herbert Bell LPN Clinical Care Coordinator   [facility]   [address] [facility] [address]   [place] [address]   [phone] (p)   [phone] (f)

## 2018-09-19 ENCOUNTER — TELEPHONE (OUTPATIENT)
Dept: FAMILY MEDICINE | Facility: CLINIC | Age: 53
End: 2018-09-19

## 2018-09-19 NOTE — TELEPHONE ENCOUNTER
----- Message from Spencer Patel sent at 9/19/2018 10:46 AM CDT -----  Type:  Sooner Apoointment Request    Caller is requesting a sooner appointment.  Caller declined first available appointment listed below.  Caller will not accept being placed on the waitlist and is requesting a message be sent to doctor.    Name of Caller:  Wife/Jessica   When is the first available appointment?  Out of Template  Symptoms:  3 week FU  Best Call Back Number:  [phone]  Additional Information:  Patient had appointment on 9/19 but had cancel

## 2018-09-20 ENCOUNTER — LAB VISIT (OUTPATIENT)
Dept: LAB | Facility: HOSPITAL | Age: 53
End: 2018-09-20
Attending: FAMILY MEDICINE
Payer: MEDICAID

## 2018-09-20 DIAGNOSIS — Z12.11 COLON CANCER SCREENING: ICD-10-CM

## 2018-09-20 LAB — HEMOCCULT STL QL IA: NEGATIVE

## 2018-09-20 PROCEDURE — 82274 ASSAY TEST FOR BLOOD FECAL: CPT

## 2018-09-26 DIAGNOSIS — F41.9 ANXIETY: ICD-10-CM

## 2018-09-26 RX ORDER — ALPRAZOLAM 0.5 MG/1
TABLET ORAL
Qty: 30 TABLET | Refills: 2 | Status: SHIPPED | OUTPATIENT
Start: 2018-09-26 | End: 2020-05-13 | Stop reason: SDUPTHER

## 2018-10-09 ENCOUNTER — TELEPHONE (OUTPATIENT)
Dept: FAMILY MEDICINE | Facility: CLINIC | Age: 53
End: 2018-10-09

## 2018-10-09 NOTE — TELEPHONE ENCOUNTER
----- Message from Lynnette Allen sent at 10/9/2018  8:03 AM CDT -----  Type: Needs Medical Advice    Who Called:  Wife (Randi)  Best Call Back Number:[phone]  Additional Information: Requesting to speak with nurse concerning accidentally cancelling patient's appointment on October 15th and patient's specimen results/please call back to reschedule and advise.

## 2018-10-09 NOTE — TELEPHONE ENCOUNTER
Spoke w/ pt's wife. Rescheduled pt's f/u appt for 10/17/18 at 845am and advised to arrive 10-15 min early.

## 2019-06-03 ENCOUNTER — TELEPHONE (OUTPATIENT)
Dept: FAMILY MEDICINE | Facility: CLINIC | Age: 54
End: 2019-06-03

## 2019-06-03 NOTE — TELEPHONE ENCOUNTER
----- Message from Timbo Hendrickson sent at 6/3/2019  8:59 AM CDT -----  Contact: pt's wife Jessica  Type:  Sooner Apoointment Request    Caller is requesting a sooner appointment.  Caller declined first available appointment listed below.  Caller will not accept being placed on the waitlist and is requesting a message be sent to doctor.    Name of Caller:  Jessica  When is the first available appointment?  7/25/19  Symptoms:  Sore throat and cough  Best Call Back Number:  [phone]  Additional Information:  Pt would like to be seen any time 11am due to work. Pt would be a self pay pt.

## 2019-06-04 ENCOUNTER — OFFICE VISIT (OUTPATIENT)
Dept: FAMILY MEDICINE | Facility: CLINIC | Age: 54
End: 2019-06-04

## 2019-06-04 ENCOUNTER — TELEPHONE (OUTPATIENT)
Dept: FAMILY MEDICINE | Facility: CLINIC | Age: 54
End: 2019-06-04

## 2019-06-04 VITALS
HEART RATE: 58 BPM | SYSTOLIC BLOOD PRESSURE: 120 MMHG | TEMPERATURE: 99 F | WEIGHT: 208.13 LBS | DIASTOLIC BLOOD PRESSURE: 86 MMHG | OXYGEN SATURATION: 96 % | HEIGHT: 71 IN | BODY MASS INDEX: 29.14 KG/M2

## 2019-06-04 DIAGNOSIS — R09.82 PND (POST-NASAL DRIP): Primary | ICD-10-CM

## 2019-06-04 DIAGNOSIS — R05.9 COUGH: ICD-10-CM

## 2019-06-04 DIAGNOSIS — N52.9 ERECTILE DYSFUNCTION, UNSPECIFIED ERECTILE DYSFUNCTION TYPE: ICD-10-CM

## 2019-06-04 PROCEDURE — 99999 PR PBB SHADOW E&M-EST. PATIENT-LVL IV: ICD-10-PCS | Mod: PBBFAC,,, | Performed by: NURSE PRACTITIONER

## 2019-06-04 PROCEDURE — 99999 PR PBB SHADOW E&M-EST. PATIENT-LVL IV: CPT | Mod: PBBFAC,,, | Performed by: NURSE PRACTITIONER

## 2019-06-04 PROCEDURE — 99214 OFFICE O/P EST MOD 30 MIN: CPT | Mod: PBBFAC,PN | Performed by: NURSE PRACTITIONER

## 2019-06-04 PROCEDURE — 99214 OFFICE O/P EST MOD 30 MIN: CPT | Mod: S$PBB,,, | Performed by: NURSE PRACTITIONER

## 2019-06-04 PROCEDURE — 99214 PR OFFICE/OUTPT VISIT, EST, LEVL IV, 30-39 MIN: ICD-10-PCS | Mod: S$PBB,,, | Performed by: NURSE PRACTITIONER

## 2019-06-04 RX ORDER — SILDENAFIL CITRATE 20 MG/1
TABLET ORAL
Qty: 90 TABLET | Refills: 2 | Status: SHIPPED | OUTPATIENT
Start: 2019-06-04 | End: 2019-08-20 | Stop reason: SDUPTHER

## 2019-06-04 NOTE — PROGRESS NOTES
This dictation has been generated using Modal Fluency Dictation some phonetic errors may occur. Please contact author for clarification if needed.     Problem List Items Addressed This Visit     None      Visit Diagnoses     PND (post-nasal drip)    -  Primary    Cough        Erectile dysfunction, unspecified erectile dysfunction type              Orders Placed This Encounter    sildenafil (REVATIO) 20 mg Tab       Postnasal drip and cough.  Recommended antihistamine  Erectile dysfunction sildenafil 20 mg 2-5 tablets by mouth 30 min prior to needed    Follow up in about 4 months (around 9/20/2019).    ________________________________________________________________  ________________________________________________________________      Chief Complaint   Patient presents with    Sore Throat     sore throat, cough congestion     History of present illness  This 53 y.o. presents today for complaint of 2 complaints.  Patient wants to talk about some sore throat symptoms and cough.  Cough is worse in the morning.  He does have some throat clearing.  He has not tried any medicine for symptoms.  Symptoms have not worsened.  It has been present for about a week.  To dry cough during the daytime and some mucus in the morning.  Denies any sinus pain. He took a cough medicine without any help.  At the end of the visit he does ask about talking about erectile dysfunction symptoms.  His sex drive is been good.  He notes that his erections are not his full and complete his he would prefer.  He is .  He does not smoke.  No hypertension or diabetes.  He has not tried any medicines for his symptoms.  He does take Lexapro and clonazepam for anxiety.  Limited alcohol intake.  Complete review of systems otherwise negative    Past medical and social history reviewed.  Patient new to me.  Follows with in the clinic.    Past Medical History:   Diagnosis Date    Anxiety 2/24/2012       History reviewed. No pertinent surgical  history.    Family History   Problem Relation Age of Onset    Thyroid disease Mother     Hypertension Mother        Social History     Socioeconomic History    Marital status:      Spouse name: Not on file    Number of children: Not on file    Years of education: Not on file    Highest education level: Not on file   Occupational History     Employer: [employer]   Social Needs    Financial resource strain: Not on file    Food insecurity:     Worry: Not on file     Inability: Not on file    Transportation needs:     Medical: Not on file     Non-medical: Not on file   Tobacco Use    Smoking status: Never Smoker    Smokeless tobacco: Never Used   Substance and Sexual Activity    Alcohol use: Yes     Alcohol/week: 4.2 oz     Types: 2 Cans of beer, 5 Standard drinks or equivalent per week    Drug use: Not on file    Sexual activity: Not on file   Lifestyle    Physical activity:     Days per week: Not on file     Minutes per session: Not on file    Stress: Not on file   Relationships    Social connections:     Talks on phone: Not on file     Gets together: Not on file     Attends Orthodox service: Not on file     Active member of club or organization: Not on file     Attends meetings of clubs or organizations: Not on file     Relationship status: Not on file   Other Topics Concern    Not on file   Social History Narrative    Not on file       Current Outpatient Medications   Medication Sig Dispense Refill    escitalopram oxalate (LEXAPRO) 10 MG tablet Take 1 tablet (10 mg total) by mouth once daily. 30 tablet 11    ALPRAZolam (XANAX) 0.5 MG tablet TAKE 1 TABLET BY MOUTH EACH NIGHT AT BEDTIME AS NEEDED FOR ANXIETY 30 tablet 2    aspirin (ECOTRIN) 81 MG EC tablet Take 81 mg by mouth once daily.      sildenafil (REVATIO) 20 mg Tab Take by mouth as directed. 90 tablet 2     No current facility-administered medications for this visit.        Review of patient's allergies indicates:  No Known  Allergies    Physical examination  Vitals Reviewed  Gen. Well-dressed well-nourished   Skin warm dry and intact.  No rashes noted.  HEENT.  TM intact bilateral with normal light reflex.  No mastoid tenderness during percussion.  Nares patent bilateral.  Pharynx is unremarkable except postnasal drip.  No maxillary or frontal sinus tenderness when percussed.    Neck is supple without adenopathy  Chest.  Respirations are even unlabored.  Lungs are clear to auscultation.  Cardiac regular rate and rhythm.  No chest wall adenopathy noted.  Neuro. Awake alert oriented x4.  Normal judgment and cognition noted.  Extremities no clubbing cyanosis or edema noted.     Call or return to clinic prn if these symptoms worsen or fail to improve as anticipated.

## 2019-06-04 NOTE — TELEPHONE ENCOUNTER
----- Message from Marcy Ames sent at 6/4/2019 10:12 AM CDT -----  Contact: Marce from [facility]   Marce from [facility] requesting to speak to nurse regarding patient states clartin was suppose to be called in per patient to Pharmacy         [facility] - [place], [place] - [address]  [address]  [place] [address]  Phone: [phone] Fax: [phone]    Patient contact is [phone] (home) [phone] (work)

## 2019-06-05 RX ORDER — LORATADINE 10 MG/1
10 TABLET ORAL DAILY
Qty: 30 TABLET | Refills: 0 | Status: SHIPPED | OUTPATIENT
Start: 2019-06-05 | End: 2021-03-11 | Stop reason: CLARIF

## 2019-07-10 ENCOUNTER — OCCUPATIONAL HEALTH (OUTPATIENT)
Dept: URGENT CARE | Facility: CLINIC | Age: 54
End: 2019-07-10

## 2019-07-10 DIAGNOSIS — Z02.83 ENCOUNTER FOR EMPLOYMENT-RELATED DRUG TESTING: Primary | ICD-10-CM

## 2019-07-10 LAB
CTP QC/QA: YES
POC 10 PANEL DRUG SCREEN: NEGATIVE

## 2019-07-10 PROCEDURE — 80305 POCT RAPID DRUG SCREEN 10 PANEL: ICD-10-PCS | Mod: QW,S$GLB,, | Performed by: FAMILY MEDICINE

## 2019-07-10 PROCEDURE — 80305 DRUG TEST PRSMV DIR OPT OBS: CPT | Mod: QW,S$GLB,, | Performed by: FAMILY MEDICINE

## 2019-07-11 DIAGNOSIS — F41.9 ANXIETY: ICD-10-CM

## 2019-07-12 RX ORDER — ALPRAZOLAM 0.5 MG/1
TABLET ORAL
Qty: 30 TABLET | Refills: 0 | Status: SHIPPED | OUTPATIENT
Start: 2019-07-12 | End: 2019-08-21 | Stop reason: SDUPTHER

## 2019-08-20 ENCOUNTER — TELEPHONE (OUTPATIENT)
Dept: FAMILY MEDICINE | Facility: CLINIC | Age: 54
End: 2019-08-20

## 2019-08-20 RX ORDER — SILDENAFIL CITRATE 20 MG/1
TABLET ORAL
Qty: 90 TABLET | Refills: 2 | Status: SHIPPED | OUTPATIENT
Start: 2019-08-20 | End: 2019-11-25 | Stop reason: SDUPTHER

## 2019-08-20 NOTE — TELEPHONE ENCOUNTER
Patient states that he is taking anywhere from 1-5 tablets a day of the revatio and is out of refills. Please advise

## 2019-08-20 NOTE — TELEPHONE ENCOUNTER
----- Message from Marcy Ames sent at 8/20/2019  9:46 AM CDT -----  Contact: self  Patient requesting refill on sildenafil (REVATIO) 20 mg Tab           [facility] - [place] - [address]  [address]  [place] [address]  Phone: [phone] Fax: [phone]      Patient contact [phone]

## 2019-08-20 NOTE — TELEPHONE ENCOUNTER
Phoned pt in regards to messages. Pt does not have questions about his xanax. He is requesting refill for his RX Revation 20 mg. Pt states that he takes 1-5 tabs daily according to specific situations. Phoned pharmacy and spoke to Keke in regards to refills noted on chart. Keke the pharmacist states the pt filled 6/4/19, 7/8/19 and 8/3/19 and is now out of refills. Please review and advise. Thank you. CLC

## 2019-08-20 NOTE — TELEPHONE ENCOUNTER
----- Message from Spencer Patel sent at 8/20/2019  1:58 PM CDT -----  Type: Needs Medical Advice    Who Called:  Patient    Pharmacy name and phone #:    [facility] - [place] - [address]  [address]  [place] [address]  Phone: [phone] Fax: [phone]      Best Call Back Number: [phone]    Additional Information: Patient states that he would like a callback regarding questions about his prescription for ALPRAZolam (XANAX) 0.5 MG tablet

## 2019-08-20 NOTE — TELEPHONE ENCOUNTER
----- Message from Kevin Hernandez sent at 8/20/2019 10:42 AM CDT -----  Contact: Patient  Type:  Patient Returning Call    Who Called:  Patient  Who Left Message for Patient:  Jackie  Does the patient know what this is regarding?:  Medication refill status   Best Call Back Number:  [phone]  sildenafil (REVATIO) 20 mg [place]     [facility] [place] - [address]  [address]  [place] [address]  Phone: [phone] Fax: [phone]

## 2019-08-20 NOTE — TELEPHONE ENCOUNTER
----- Message from Kevin Hernandez sent at 8/20/2019 10:47 AM CDT -----  Contact: Patient  Type:  RX Refill Request    Who Called:  Patient  Refill or New Rx:  Refill  RX Name and Strength:  sildenafil (REVATIO) 20 mg Tab   How is the patient currently taking it? (ex. 1XDay):  As needed   Is this a 30 day or 90 day RX:  90  Preferred Pharmacy with phone number:    [facility] [place] [facility] [address]  [address]  [facility] [address]  Phone: [phone] Fax: [phone]  Local or Mail Order:  Local  Ordering Provider:  Carlos Kim Call Back Number:  [phone]  Additional Information:  Please advise when refill is complete

## 2019-08-21 ENCOUNTER — TELEPHONE (OUTPATIENT)
Dept: FAMILY MEDICINE | Facility: CLINIC | Age: 54
End: 2019-08-21

## 2019-08-21 DIAGNOSIS — F41.9 ANXIETY: ICD-10-CM

## 2019-08-21 RX ORDER — ALPRAZOLAM 0.5 MG/1
TABLET ORAL
Qty: 30 TABLET | Refills: 0 | Status: SHIPPED | OUTPATIENT
Start: 2019-08-21 | End: 2019-10-02 | Stop reason: SDUPTHER

## 2019-08-21 RX ORDER — ESCITALOPRAM OXALATE 10 MG/1
10 TABLET ORAL DAILY
Qty: 30 TABLET | Refills: 0 | Status: SHIPPED | OUTPATIENT
Start: 2019-08-21 | End: 2019-11-26 | Stop reason: SDUPTHER

## 2019-08-21 NOTE — TELEPHONE ENCOUNTER
Tried to reach pt. No answer. Left message that rx was sent yesterday and to call back if any problems or questions.

## 2019-08-21 NOTE — TELEPHONE ENCOUNTER
----- Message from Lino Wiggins sent at 8/21/2019 12:24 PM CDT -----  Contact: patient   Type:  Patient Returning Call    Who Called: patient   Who Left Message for Patient:  Charlene   Does the patient know what this is regarding?:  n/a  Best Call Back Number: [phone]

## 2019-08-21 NOTE — TELEPHONE ENCOUNTER
----- Message from Gem Zimmer sent at 8/21/2019 11:56 AM CDT -----      Type:  RX Refill Request    Who Called:  pt  Refill   RX Name and Strength: sildenafil 20  mg  How is the patient currently taking it? (ex. 1XDay):   As  needed  Is this a 30 day or 90 day RX:  90  Cap per pt  Preferred Pharm[facility] with phone number:    [facility] [place] [place] [facility] [address]  [address]  [place] [address]  Phone: [phone] Fax: [phone]  Best Call Back Number:[phone]  Additional Information  Calling at  refills

## 2019-10-02 ENCOUNTER — TELEPHONE (OUTPATIENT)
Dept: FAMILY MEDICINE | Facility: CLINIC | Age: 54
End: 2019-10-02

## 2019-10-02 ENCOUNTER — OFFICE VISIT (OUTPATIENT)
Dept: FAMILY MEDICINE | Facility: CLINIC | Age: 54
End: 2019-10-02

## 2019-10-02 VITALS
TEMPERATURE: 99 F | WEIGHT: 197 LBS | HEIGHT: 71 IN | BODY MASS INDEX: 27.58 KG/M2 | DIASTOLIC BLOOD PRESSURE: 72 MMHG | SYSTOLIC BLOOD PRESSURE: 136 MMHG

## 2019-10-02 DIAGNOSIS — R19.7 DIARRHEA, UNSPECIFIED TYPE: Primary | ICD-10-CM

## 2019-10-02 DIAGNOSIS — F41.9 ANXIETY: ICD-10-CM

## 2019-10-02 PROCEDURE — 99211 OFF/OP EST MAY X REQ PHY/QHP: CPT | Mod: S$PBB,,, | Performed by: FAMILY MEDICINE

## 2019-10-02 PROCEDURE — 99211 PR OFFICE/OUTPT VISIT, EST, LEVL I: ICD-10-PCS | Mod: S$PBB,,, | Performed by: FAMILY MEDICINE

## 2019-10-02 PROCEDURE — 99213 OFFICE O/P EST LOW 20 MIN: CPT | Mod: PBBFAC,PN | Performed by: FAMILY MEDICINE

## 2019-10-02 PROCEDURE — 99999 PR PBB SHADOW E&M-EST. PATIENT-LVL III: CPT | Mod: PBBFAC,,, | Performed by: FAMILY MEDICINE

## 2019-10-02 PROCEDURE — 99999 PR PBB SHADOW E&M-EST. PATIENT-LVL III: ICD-10-PCS | Mod: PBBFAC,,, | Performed by: FAMILY MEDICINE

## 2019-10-02 NOTE — TELEPHONE ENCOUNTER
----- Message from Marcy Ames sent at 10/2/2019 12:00 PM CDT -----  Contact: wife Jessica  Patient wife calling to get doctor excuse due to patient missed work today due to stomach bug      Patient need excuse to return to work tomorrow 10/03      Wife will like to  excuse today       Please call to advice [phone]

## 2019-10-02 NOTE — PROGRESS NOTES
"Subjective:       Patient ID: Jcarlos Rebolledo is a 54 y.o. male.    Chief Complaint: Diarrhea (Diarrhea x "few days")    Diarrhea for 2 days.  Three bowel movements per day.  Usually after eating.  No blood.  No fever.  Decreased appetite.  I reviewed his weights.  They have varied up and down over the past 2 years.  He said that his wife in daughter had some diarrhea 1 month ago.  He feels that his new position and the stresses associated have been causing some of his diarrhea.  He also complains of fatigue and headache.  He has a history of anxiety and continues to take Lexapro 10 mg.  Overall he thinks his anxiety is controlled.  He is sleeping reasonably well.    Review of Systems   Constitutional: Negative for chills and fever.   Respiratory: Negative for cough.    Cardiovascular: Negative for chest pain.   Gastrointestinal: Positive for diarrhea. Negative for abdominal pain, blood in stool and nausea.       Objective:     Blood pressure 136/72, temperature 99 °F (37.2 °C), temperature source Oral, height 5' 11" (1.803 m), weight 89.4 kg (196 lb 15.7 oz).      Physical Exam   Constitutional: He appears well-developed and well-nourished. No distress.   Neck: No thyromegaly present.   Cardiovascular: Normal rate, regular rhythm and normal heart sounds.   Pulmonary/Chest: Effort normal and breath sounds normal. No respiratory distress.   Abdominal: Soft. Bowel sounds are normal. He exhibits no distension and no mass. There is no tenderness.   Musculoskeletal: He exhibits no edema.   Lymphadenopathy:     He has no cervical adenopathy.   Neurological: He is alert.       Assessment:       1. Diarrhea, unspecified type    2. Anxiety        Plan:       I gave him a note to be off work the rest of this week.  [place] diet.  I would expect his diarrhea to improve.  Follow up if not improving.  [address] he is uninsured.      "

## 2019-10-02 NOTE — TELEPHONE ENCOUNTER
----- Message from Gab Slade sent at 10/2/2019 12:56 PM CDT -----  Contact: pt wife jessica  Type: Needs Medical Advice    Who Called:  Jessica    Julio Call Back Number: [phone]  Additional Information: needs the note to state 10.2.19 and return tomorrow 10.3.19. Please call to advise

## 2019-10-02 NOTE — TELEPHONE ENCOUNTER
Pt's wife states her  has been having diarrhea and missed wok this morning and needs a note to return to work. He is attempting to go back to work his afternoon.

## 2019-10-15 DIAGNOSIS — F41.9 ANXIETY: ICD-10-CM

## 2019-10-16 RX ORDER — ALPRAZOLAM 0.5 MG/1
TABLET ORAL
Qty: 30 TABLET | Refills: 0 | Status: SHIPPED | OUTPATIENT
Start: 2019-10-16 | End: 2019-11-26 | Stop reason: SDUPTHER

## 2019-11-25 RX ORDER — SILDENAFIL CITRATE 20 MG/1
TABLET ORAL
Qty: 90 TABLET | Refills: 2 | Status: SHIPPED | OUTPATIENT
Start: 2019-11-25 | End: 2020-02-24 | Stop reason: SDUPTHER

## 2019-11-25 NOTE — TELEPHONE ENCOUNTER
----- Message from Mouna Boyer sent at 11/25/2019  8:16 AM CST -----  Contact: PT  Type:  RX Refill Request    Who Called:  PT  Refill or New Rx:  Refill  RX Name and Strength:  sildenafil (REVATIO) 20 mg Tab  How is the patient currently taking it? (ex. 1XDay):  As directed  Is this a 30 day or 90 day RX:  90  Preferred Pharmacy with phone number:    [facility] [place] - [address]  [address]  [facility] [address]  Phone: [phone] Fax: [phone]  Local or Mail Order:  local  Ordering Provider:  Justyn Kim Call Back Number:  [phone]  Additional Information:  Please Advise ---Thank you

## 2019-11-26 DIAGNOSIS — F41.9 ANXIETY: ICD-10-CM

## 2019-11-26 RX ORDER — ESCITALOPRAM OXALATE 10 MG/1
TABLET ORAL
Qty: 30 TABLET | Refills: 0 | Status: SHIPPED | OUTPATIENT
Start: 2019-11-26 | End: 2019-12-24 | Stop reason: SDUPTHER

## 2019-11-26 RX ORDER — ALPRAZOLAM 0.5 MG/1
TABLET ORAL
Qty: 30 TABLET | Refills: 0 | Status: SHIPPED | OUTPATIENT
Start: 2019-11-26 | End: 2020-01-27

## 2019-12-23 ENCOUNTER — PATIENT OUTREACH (OUTPATIENT)
Dept: ADMINISTRATIVE | Facility: HOSPITAL | Age: 54
End: 2019-12-23

## 2019-12-24 RX ORDER — ESCITALOPRAM OXALATE 10 MG/1
TABLET ORAL
Qty: 30 TABLET | Refills: 0 | Status: SHIPPED | OUTPATIENT
Start: 2019-12-24 | End: 2020-02-21

## 2020-01-27 DIAGNOSIS — F41.9 ANXIETY: ICD-10-CM

## 2020-01-27 RX ORDER — ALPRAZOLAM 0.5 MG/1
TABLET ORAL
Qty: 30 TABLET | Refills: 0 | Status: SHIPPED | OUTPATIENT
Start: 2020-01-27 | End: 2020-03-13

## 2020-02-20 ENCOUNTER — TELEPHONE (OUTPATIENT)
Dept: FAMILY MEDICINE | Facility: CLINIC | Age: 55
End: 2020-02-20

## 2020-02-20 NOTE — TELEPHONE ENCOUNTER
Spoke with pt's wife, she states pt is taking lexapro 10mg and he would like a dose increase due to increased stress at work. He is having trouble falling asleep. No negative side effects.

## 2020-02-20 NOTE — TELEPHONE ENCOUNTER
----- Message from Princess LINDA Rebolledo sent at 2/20/2020 11:55 AM CST -----  Contact: Jessica Rebolledo (Spouse)  Type: Needs Medical Advice    Who Called:  Jessica Rebolledo (Spouse)  Best Call Back Number: [phone]  Additional Information: requesting a call in regards to patient's rx for (escitalopram oxalate (LEXAPRO) 10 MG tablet). Patient would like the rx to be increase due to the some much going on. Please call to advise.

## 2020-02-21 RX ORDER — ESCITALOPRAM OXALATE 10 MG/1
15 TABLET ORAL DAILY
Qty: 45 TABLET | Refills: 2 | Status: SHIPPED | OUTPATIENT
Start: 2020-02-21 | End: 2020-07-02 | Stop reason: SDUPTHER

## 2020-02-24 RX ORDER — SILDENAFIL CITRATE 20 MG/1
TABLET ORAL
Qty: 90 TABLET | Refills: 2 | Status: SHIPPED | OUTPATIENT
Start: 2020-02-24 | End: 2020-04-21

## 2020-02-24 NOTE — TELEPHONE ENCOUNTER
----- Message from Jazmyne Valdez sent at 2/24/2020  8:42 AM CST -----  Contact: Jcarlos chapa  Type:  RX Refill Request    Who Called:  Jcarlos  Refill or New Rx:  refill  RX Name and Strength:  sildenafil (REVATIO) 20 mg Tab  How is the patient currently taking it? (ex. 1XDay):  As needed  Is this a 30 day or 90 day RX:  30  Preferred Pharmacy with phone number:    [facility]  [place] - [address]  [address]  [place] [address]  Phone: [phone] Fax: [phone]    Local or Mail Order:  local  Ordering Provider:  Justyn Kim Call Back Number:  [phone]  Additional Information:  Pls call pt to adv if any issues

## 2020-02-27 ENCOUNTER — TELEPHONE (OUTPATIENT)
Dept: FAMILY MEDICINE | Facility: CLINIC | Age: 55
End: 2020-02-27

## 2020-02-27 NOTE — TELEPHONE ENCOUNTER
----- Message from Noreen Barney sent at 2/27/2020  8:07 AM CST -----  Contact: pt [phone]  Refill on his Sildenafil 20 mg to be called into [facility] pharmacy pt is asking  For a call back today.  Pt states he called last week for the refill. Pt states to leave a voice message

## 2020-03-13 DIAGNOSIS — F41.9 ANXIETY: ICD-10-CM

## 2020-03-13 RX ORDER — ALPRAZOLAM 0.5 MG/1
TABLET ORAL
Qty: 30 TABLET | Refills: 0 | Status: SHIPPED | OUTPATIENT
Start: 2020-03-13 | End: 2020-05-13 | Stop reason: SDUPTHER

## 2020-04-21 RX ORDER — SILDENAFIL CITRATE 20 MG/1
TABLET ORAL
Qty: 90 TABLET | Refills: 2 | Status: SHIPPED | OUTPATIENT
Start: 2020-04-21 | End: 2020-07-10

## 2020-05-12 ENCOUNTER — TELEPHONE (OUTPATIENT)
Dept: FAMILY MEDICINE | Facility: CLINIC | Age: 55
End: 2020-05-12

## 2020-05-12 DIAGNOSIS — F41.9 ANXIETY: ICD-10-CM

## 2020-05-12 NOTE — TELEPHONE ENCOUNTER
----- Message from Alberto PALACIOS Frisergio sent at 5/12/2020  9:05 AM CDT -----  Contact: Wife/Jessica  Type:  RX Refill Request    Who Called:  Wife/Jessica  Refill or New Rx:  New Rx  RX Name and Strength:  ALPRAZolam (XANAX) 0.5 MG tablet  How is the patient currently taking it? (ex. 1XDay):  TAKE 1 TABLET BY MOUTH EACH NIGHT AT BEDTIME AS NEEDED FOR ANXIETY  Is this a 30 day or 90 day RX:  30 tablet 0 3/13/2020   Preferred Pharmacy with phone number:    [facility] [place] - [address]  [address]  [facility] [address]  Phone: [phone] Fax: [phone]  Ordering Provider:  Justyn Kim Call Back Number:  [phone]  Additional Information:  n/a

## 2020-05-12 NOTE — TELEPHONE ENCOUNTER
----- Message from Alberto Merchant sent at 5/12/2020  9:09 AM CDT -----  Contact: Wife/Jessica Lopez called in and stated patient feels like the Rx forescitalopram oxalate (LEXAPRO) 10 MG tablet is not really working and wanted to see if he could get something else or possibly get the dosage increased?    Jessica's call back number is [phone]

## 2020-05-12 NOTE — TELEPHONE ENCOUNTER
Spoke with pt's wife, BHARATI on file. She states pt is on lexapro 10mg, taking 1.5 tabs daily. He does not feel it is making any difference, still having trouble with anxiety and panic attacks.   He needs xanax refill, they will call back to schedule appt.

## 2020-05-13 ENCOUNTER — TELEPHONE (OUTPATIENT)
Dept: FAMILY MEDICINE | Facility: CLINIC | Age: 55
End: 2020-05-13

## 2020-05-13 RX ORDER — ALPRAZOLAM 0.5 MG/1
0.5 TABLET ORAL 2 TIMES DAILY PRN
Qty: 30 TABLET | Refills: 0 | Status: SHIPPED | OUTPATIENT
Start: 2020-05-13 | End: 2020-08-03

## 2020-05-13 NOTE — TELEPHONE ENCOUNTER
I called twice and LMOR. I will refill xanax for now. I would like to see him in person or on video

## 2020-05-14 ENCOUNTER — TELEPHONE (OUTPATIENT)
Dept: FAMILY MEDICINE | Facility: CLINIC | Age: 55
End: 2020-05-14

## 2020-05-14 NOTE — TELEPHONE ENCOUNTER
----- Message from Arlette Ferrer sent at 5/14/2020 10:11 AM CDT -----  Contact: Patient  Type:  Patient Returning Call    Who Called:  Patient  Who Left Message for Patient:  Jackie  Does the patient know what this is regarding?:  yes  Best Call Back Number:    Additional Information:  No response on skype. Patient say's to just leave a voicemail.

## 2020-06-22 ENCOUNTER — PATIENT OUTREACH (OUTPATIENT)
Dept: ADMINISTRATIVE | Facility: HOSPITAL | Age: 55
End: 2020-06-22

## 2020-06-22 NOTE — PROGRESS NOTES
Chart review completed 06/22/2020  Care Everywhere updates requested and reviewed.  Immunizations reconciled. Media reviewed.

## 2020-07-02 ENCOUNTER — LAB VISIT (OUTPATIENT)
Dept: LAB | Facility: HOSPITAL | Age: 55
End: 2020-07-02
Attending: FAMILY MEDICINE
Payer: MEDICAID

## 2020-07-02 ENCOUNTER — OFFICE VISIT (OUTPATIENT)
Dept: FAMILY MEDICINE | Facility: CLINIC | Age: 55
End: 2020-07-02
Payer: MEDICAID

## 2020-07-02 VITALS
HEIGHT: 71 IN | TEMPERATURE: 98 F | WEIGHT: 201.38 LBS | DIASTOLIC BLOOD PRESSURE: 96 MMHG | BODY MASS INDEX: 28.19 KG/M2 | SYSTOLIC BLOOD PRESSURE: 142 MMHG

## 2020-07-02 DIAGNOSIS — Z00.00 HEALTH MAINTENANCE EXAMINATION: Primary | ICD-10-CM

## 2020-07-02 DIAGNOSIS — R73.01 IMPAIRED FASTING GLUCOSE: ICD-10-CM

## 2020-07-02 DIAGNOSIS — F41.9 ANXIETY: ICD-10-CM

## 2020-07-02 DIAGNOSIS — Z00.00 HEALTH MAINTENANCE EXAMINATION: ICD-10-CM

## 2020-07-02 DIAGNOSIS — B35.4 TINEA CORPORIS: ICD-10-CM

## 2020-07-02 LAB
ALBUMIN SERPL BCP-MCNC: 4.3 G/DL (ref 3.5–5.2)
ALP SERPL-CCNC: 71 U/L (ref 55–135)
ALT SERPL W/O P-5'-P-CCNC: 15 U/L (ref 10–44)
ANION GAP SERPL CALC-SCNC: 8 MMOL/L (ref 8–16)
AST SERPL-CCNC: 13 U/L (ref 10–40)
BILIRUB SERPL-MCNC: 1.1 MG/DL (ref 0.1–1)
BUN SERPL-MCNC: 15 MG/DL (ref 6–20)
CALCIUM SERPL-MCNC: 9.5 MG/DL (ref 8.7–10.5)
CHLORIDE SERPL-SCNC: 106 MMOL/L (ref 95–110)
CHOLEST SERPL-MCNC: 136 MG/DL (ref 120–199)
CHOLEST/HDLC SERPL: 2.1 {RATIO} (ref 2–5)
CO2 SERPL-SCNC: 28 MMOL/L (ref 23–29)
CREAT SERPL-MCNC: 1.1 MG/DL (ref 0.5–1.4)
ERYTHROCYTE [DISTWIDTH] IN BLOOD BY AUTOMATED COUNT: 13.6 % (ref 11.5–14.5)
EST. GFR  (AFRICAN AMERICAN): >60 ML/MIN/1.73 M^2
EST. GFR  (NON AFRICAN AMERICAN): >60 ML/MIN/1.73 M^2
GLUCOSE SERPL-MCNC: 114 MG/DL (ref 70–110)
HCT VFR BLD AUTO: 52.2 % (ref 40–54)
HDLC SERPL-MCNC: 66 MG/DL (ref 40–75)
HDLC SERPL: 48.5 % (ref 20–50)
HGB BLD-MCNC: 16.4 G/DL (ref 14–18)
LDLC SERPL CALC-MCNC: 62.4 MG/DL (ref 63–159)
MCH RBC QN AUTO: 30.7 PG (ref 27–31)
MCHC RBC AUTO-ENTMCNC: 31.4 G/DL (ref 32–36)
MCV RBC AUTO: 98 FL (ref 82–98)
NONHDLC SERPL-MCNC: 70 MG/DL
PLATELET # BLD AUTO: 255 K/UL (ref 150–350)
PMV BLD AUTO: 11.5 FL (ref 9.2–12.9)
POTASSIUM SERPL-SCNC: 4.7 MMOL/L (ref 3.5–5.1)
PROT SERPL-MCNC: 6.9 G/DL (ref 6–8.4)
RBC # BLD AUTO: 5.35 M/UL (ref 4.6–6.2)
SODIUM SERPL-SCNC: 142 MMOL/L (ref 136–145)
TRIGL SERPL-MCNC: 38 MG/DL (ref 30–150)
WBC # BLD AUTO: 5.56 K/UL (ref 3.9–12.7)

## 2020-07-02 PROCEDURE — 99396 PREV VISIT EST AGE 40-64: CPT | Mod: S$PBB,,, | Performed by: FAMILY MEDICINE

## 2020-07-02 PROCEDURE — 85027 COMPLETE CBC AUTOMATED: CPT

## 2020-07-02 PROCEDURE — 83036 HEMOGLOBIN GLYCOSYLATED A1C: CPT

## 2020-07-02 PROCEDURE — 99999 PR PBB SHADOW E&M-EST. PATIENT-LVL III: ICD-10-PCS | Mod: PBBFAC,,, | Performed by: FAMILY MEDICINE

## 2020-07-02 PROCEDURE — 80053 COMPREHEN METABOLIC PANEL: CPT

## 2020-07-02 PROCEDURE — 99396 PR PREVENTIVE VISIT,EST,40-64: ICD-10-PCS | Mod: S$PBB,,, | Performed by: FAMILY MEDICINE

## 2020-07-02 PROCEDURE — 99213 OFFICE O/P EST LOW 20 MIN: CPT | Mod: PBBFAC,PN | Performed by: FAMILY MEDICINE

## 2020-07-02 PROCEDURE — 36415 COLL VENOUS BLD VENIPUNCTURE: CPT | Mod: PN

## 2020-07-02 PROCEDURE — 80061 LIPID PANEL: CPT

## 2020-07-02 PROCEDURE — 99999 PR PBB SHADOW E&M-EST. PATIENT-LVL III: CPT | Mod: PBBFAC,,, | Performed by: FAMILY MEDICINE

## 2020-07-02 RX ORDER — ESCITALOPRAM OXALATE 10 MG/1
15 TABLET ORAL DAILY
Qty: 135 TABLET | Refills: 2 | Status: SHIPPED | OUTPATIENT
Start: 2020-07-02 | End: 2021-03-11 | Stop reason: CLARIF

## 2020-07-02 RX ORDER — SELENIUM SULFIDE 2.5 MG/100ML
1 LOTION TOPICAL ONCE
Qty: 120 ML | Refills: 2 | Status: SHIPPED | OUTPATIENT
Start: 2020-07-02 | End: 2020-07-02

## 2020-07-02 NOTE — PROGRESS NOTES
"Quach    bjective:       Patient ID: Jcarlos Rebolledo is a 54 y.o. male.    Chief Complaint: Anxiety (3 mo med f/u)    Annual exam and follow-up anxiety.  He takes Xanax 1 to 2 times a week.  We increased Lexapro from 10-15 mg in February because of increased work related stress.  He resigned about 3 weeks ago because of stress and work load.  He still has some anxiety level.  He checks his blood pressure at home and it has been fairly well controlled but he does not remember the values.  I reviewed his chart and found some elevated fasting blood sugars in the past.  He relates some discoloration on his skin.  Past Medical History, Surgical History, Family History, Social History, Medications and Allergies reviewed.       Review of Systems   Constitutional: Negative for fatigue, fever and unexpected weight change.   HENT: Negative.    Eyes: Negative for visual disturbance.   Respiratory: Negative for cough, shortness of breath and wheezing.    Cardiovascular: Negative for chest pain, palpitations and leg swelling.   Gastrointestinal: Negative for abdominal pain, blood in stool and diarrhea.   Genitourinary: Negative for difficulty urinating and hematuria.   Integumentary:         No neoplasms    Neurological: Negative for weakness and numbness.   Psychiatric/Behavioral: The patient is nervous/anxious.          Objective:     Blood pressure (!) 142/96, temperature 98.2 °F (36.8 °C), temperature source Temporal, height 5' 11" (1.803 m), weight 91.3 kg (201 lb 6.2 oz).      Physical Exam  Constitutional:       Appearance: He is well-developed. He is not ill-appearing.      Comments: No distress   Eyes:      Conjunctiva/sclera: Conjunctivae normal.      Pupils: Pupils are equal, round, and reactive to light.   Neck:      Musculoskeletal: Normal range of motion.      Thyroid: No thyromegaly.   Cardiovascular:      Rate and Rhythm: Normal rate and regular rhythm.      Heart sounds: Normal heart sounds. No murmur. "   Pulmonary:      Effort: Pulmonary effort is normal.      Breath sounds: Normal breath sounds. No wheezing or rales.   Abdominal:      General: Bowel sounds are normal.      Palpations: Abdomen is soft. There is no mass.      Tenderness: There is no abdominal tenderness.   Lymphadenopathy:      Cervical: No cervical adenopathy.   Skin:     Comments: He has widespread beige slightly scaly macules on trunk arms and legs.  Pigmented macule with slightly raise center with a lighter color overlying his right scapula.  See the photograph.  I want him to return for a biopsy.   Neurological:      Mental Status: He is alert.   Psychiatric:      Comments: He does seem a bit anxious         Assessment:       1. Health maintenance examination    2. Tinea corporis    3. Anxiety    4. Impaired fasting glucose        Plan:       Monitor blood pressure.  Continue Lexapro 15 mg.  He uses Xanax sparingly.  Lab work ordered.  Follow-up for punch biopsy.  Selenium sulfide for tinea versicolor.

## 2020-07-02 NOTE — Clinical Note
Please call him and schedule punch biopsy of mole on his back. I discussed that a little but did not tell him to schedule while he was here.

## 2020-07-02 NOTE — PATIENT INSTRUCTIONS
Tinea Versicolor  This is a rash caused by a fungus in the top layers of the skin. This fungus is normally present in the pores of the skin and causes no symptoms. But when the fungus overgrows it causes a rash. The fungus grows more easily in hot climates, and on oily or sweaty skin. Health experts dont know why some people get this rash and others dont. Experts also dont know why the rash will suddenly appear in someone who has never had it before.  The rash is made up of irregular pale or tan spots and patches. The rash is usually on the neck, upper back, chest, and shoulders. You may have mild itching, especially if you become overheated. But it doesn't cause other symptoms. Because these spots don't change color with sun exposure like normal skin, the rash may be lighter or darker than your normal skin.  This rash is harmless and usually causes no symptoms. The only reason for treatment is to improve appearance. Follow the advice below to clear the rash. It might take several months for normal skin color to return.  Home care  · Use a medicated dandruff shampoo over your whole body while in the shower. Dont use soap. Let the shampoo stay on for at least a few minutes before rinsing off. Do this every day for 4 weeks.  · As a different treatment, you may buy an antifungal cream (miconazole or clotrimazole, both available without a prescription). Use this 2 times a day for 7 days.   · This rash is not contagious to others. It cant be spread if someone touches it. So you dont have to worry about exposing others at school, , or work.  Prevention  This fungus can come back again (recur) after treatment. To prevent return of the rash, use medicated dandruff shampoo over your whole body when in the shower. Do this once a month for the next year. This is very important to do in the summertime. That is when the rash is most likely to recur.  Other prevention tips include:  · Avoid oily skin  products  · Wear loose clothing. Try to let your skin stay cool and breathe.  · Use sunscreen and protect yourself from sunlight  · Avoid tanning beds  Follow-up care  Follow up with your healthcare provider, or as advised. Call your provider if the rash doesnt get better with the above treatment, or if new symptoms appear.  When to seek medical advice  Call your healthcare provider right away if any of these occur:  · Increasing redness of the rash  · Change in appearance of the rash  · Fever of 100.4ºF (38ºC) or higher, or as directed by your provider  Date Last Reviewed: 8/1/2016  © 4017-6839 [employer]. [address] [place] [address], [place] [address]. All rights reserved. This information is not intended as a substitute for professional medical care. Always follow your healthcare professional's instructions.

## 2020-07-03 LAB
ESTIMATED AVG GLUCOSE: 108 MG/DL (ref 68–131)
HBA1C MFR BLD HPLC: 5.4 % (ref 4–5.6)

## 2020-07-10 RX ORDER — SILDENAFIL CITRATE 20 MG/1
TABLET ORAL
Qty: 90 TABLET | Refills: 2 | Status: SHIPPED | OUTPATIENT
Start: 2020-07-10 | End: 2021-03-11 | Stop reason: CLARIF

## 2020-07-10 NOTE — TELEPHONE ENCOUNTER
Left a message on patients voice mail that his medication was sent to his pharmacy and to call the office if he has any further questions.

## 2020-07-13 ENCOUNTER — TELEPHONE (OUTPATIENT)
Dept: FAMILY MEDICINE | Facility: CLINIC | Age: 55
End: 2020-07-13

## 2020-07-13 NOTE — TELEPHONE ENCOUNTER
Rec'd PA request from [facility](fax [phone]) for escitalopram 10mg. 1.5 tab daily #135.    Initiated PA via [employer].  KEY: [id number]  Dx code: F41.9   Awaiting determination response:    Your information has been sent to the [facility]

## 2020-07-22 ENCOUNTER — TELEPHONE (OUTPATIENT)
Dept: FAMILY MEDICINE | Facility: CLINIC | Age: 55
End: 2020-07-22

## 2020-07-22 NOTE — TELEPHONE ENCOUNTER
Tried to reach pt. No answer. Left message for pt to call back. Will sched extended appt for rafia he calls back.

## 2020-07-22 NOTE — TELEPHONE ENCOUNTER
----- Message from Ashley Bowser sent at 7/7/2020  9:20 AM CDT -----    ----- Message -----  From: Evan Alejandra MD  Sent: 7/2/2020  11:38 AM CDT  To: Justyn SOMMER. Staff    Please call him and schedule punch biopsy of mole on his back. I discussed that a little but did not tell him to schedule while he was here.

## 2020-07-23 ENCOUNTER — PATIENT MESSAGE (OUTPATIENT)
Dept: FAMILY MEDICINE | Facility: CLINIC | Age: 55
End: 2020-07-23

## 2020-07-27 ENCOUNTER — TELEPHONE (OUTPATIENT)
Dept: FAMILY MEDICINE | Facility: CLINIC | Age: 55
End: 2020-07-27

## 2020-07-27 NOTE — TELEPHONE ENCOUNTER
----- Message from Evan Velez sent at 7/27/2020  9:33 AM CDT -----  Type:  Patient Returning Call    Who Called:  Patient  Who Left Message for Patient:  Jackie Huizar  Does the patient know what this is regarding?:  see previous messages  Best Call Back Number:  [phone]  Additional Information:  NA

## 2020-07-27 NOTE — TELEPHONE ENCOUNTER
----- Message from Megan Saul sent at 7/27/2020  2:59 PM CDT -----  Contact: self  Patient is requesting another call back from Robyn he forgot to talk to your about a couple more things.  Call back at [phone] (home).  Thanks

## 2020-07-30 ENCOUNTER — PATIENT OUTREACH (OUTPATIENT)
Dept: ADMINISTRATIVE | Facility: HOSPITAL | Age: 55
End: 2020-07-30

## 2020-07-30 NOTE — PROGRESS NOTES
Chart review completed 2020.  Care Everywhere updates requested and reviewed.  Immunizations reconciled. Media reports reviewed.  Duplicate HM overrides and  orders removed.  Overdue HM topic chart audit and/or requested.  Overdue lab testing linked to upcoming lab appointments if applies.    Lab akbar, and quest reviewed.    Health Maintenance Due   Topic Date Due    Hepatitis C Screening  1965    HIV Screening  1980    Shingles Vaccine (1 of 2) 2015    Colorectal Cancer Screening  2018    TETANUS VACCINE  10/18/2019

## 2020-08-03 DIAGNOSIS — F41.9 ANXIETY: ICD-10-CM

## 2020-08-03 RX ORDER — ALPRAZOLAM 0.5 MG/1
TABLET ORAL
Qty: 30 TABLET | Refills: 3 | Status: SHIPPED | OUTPATIENT
Start: 2020-08-03 | End: 2021-01-21

## 2020-08-31 ENCOUNTER — PATIENT OUTREACH (OUTPATIENT)
Dept: ADMINISTRATIVE | Facility: HOSPITAL | Age: 55
End: 2020-08-31

## 2020-08-31 NOTE — PROGRESS NOTES
Health Maintenance Due   Topic Date Due    Hepatitis C Screening  1965    HIV Screening  1980    Shingles Vaccine (1 of 2) 2015    Colorectal Cancer Screening  2018    TETANUS VACCINE  10/18/2019       Chart review completed 2020.  Care Everywhere updates requested and reviewed.  Immunizations reconciled. Media reports reviewed.  Duplicate HM overrides and  orders removed.  Overdue HM topic chart audit and/or requested.  Overdue lab testing linked to upcoming lab appointments if applies.

## 2020-09-04 DIAGNOSIS — Z12.11 COLON CANCER SCREENING: ICD-10-CM

## 2020-09-14 ENCOUNTER — OFFICE VISIT (OUTPATIENT)
Dept: FAMILY MEDICINE | Facility: CLINIC | Age: 55
End: 2020-09-14
Payer: MEDICAID

## 2020-09-14 DIAGNOSIS — B36.0 TINEA VERSICOLOR: ICD-10-CM

## 2020-09-14 DIAGNOSIS — D22.9 ATYPICAL NEVUS: Primary | ICD-10-CM

## 2020-09-14 PROCEDURE — 99999 PR PBB SHADOW E&M-EST. PATIENT-LVL II: CPT | Mod: PBBFAC,,, | Performed by: FAMILY MEDICINE

## 2020-09-14 PROCEDURE — 88305 TISSUE EXAM BY PATHOLOGIST: CPT | Mod: 26,,, | Performed by: PATHOLOGY

## 2020-09-14 PROCEDURE — 11104 PR PUNCH BIOPSY, SKIN, SINGLE LESION: ICD-10-PCS | Mod: S$PBB,,, | Performed by: FAMILY MEDICINE

## 2020-09-14 PROCEDURE — 11104 PUNCH BX SKIN SINGLE LESION: CPT | Mod: PBBFAC,PN | Performed by: FAMILY MEDICINE

## 2020-09-14 PROCEDURE — 11104 PUNCH BX SKIN SINGLE LESION: CPT | Mod: S$PBB,,, | Performed by: FAMILY MEDICINE

## 2020-09-14 PROCEDURE — 88305 TISSUE EXAM BY PATHOLOGIST: CPT | Performed by: PATHOLOGY

## 2020-09-14 PROCEDURE — 99999 PR PBB SHADOW E&M-EST. PATIENT-LVL II: ICD-10-PCS | Mod: PBBFAC,,, | Performed by: FAMILY MEDICINE

## 2020-09-14 PROCEDURE — 99212 OFFICE O/P EST SF 10 MIN: CPT | Mod: PBBFAC,PN | Performed by: FAMILY MEDICINE

## 2020-09-14 PROCEDURE — 99499 UNLISTED E&M SERVICE: CPT | Mod: S$PBB,,, | Performed by: FAMILY MEDICINE

## 2020-09-14 PROCEDURE — 99499 NO LOS: ICD-10-PCS | Mod: S$PBB,,, | Performed by: FAMILY MEDICINE

## 2020-09-14 PROCEDURE — 88305 TISSUE EXAM BY PATHOLOGIST: ICD-10-PCS | Mod: 26,,, | Performed by: PATHOLOGY

## 2020-09-14 RX ORDER — SELENIUM SULFIDE 2.5 MG/100ML
1 LOTION TOPICAL ONCE
Qty: 120 ML | Refills: 2 | Status: SHIPPED | OUTPATIENT
Start: 2020-09-14 | End: 2020-09-14

## 2020-09-14 NOTE — PROGRESS NOTES
Atypical nevus overlying the right scapula.  Also I had seen some skin discoloration on his posterior trunk at the last visit and diagnosed tinea versicolor.  I had forgotten to prescribe selenium sulfide for him so I will do that today.  7 x 15 mm pigmented nevus with 2 colors.  Patient identified by name and date of birth. I did not leave the treatment room after identification. Oral consent obtained.  Lesion as described  Anesthesia with syringe containing 2.8 ml lidocaine 1% with epinephrine and 0.3 ml sodium bicarbonate.   Betadyne prep. Sterile gloves  5 mm punch Bx and specimen to path  Closed with 5-0 nylon: 1 vertical mattress and 1 simple suture  Wound care discussed. Will send letter with pathology report. Suture removal 1 week.

## 2020-09-17 LAB
FINAL PATHOLOGIC DIAGNOSIS: NORMAL
GROSS: NORMAL
MICROSCOPIC EXAM: NORMAL

## 2020-09-21 ENCOUNTER — CLINICAL SUPPORT (OUTPATIENT)
Dept: FAMILY MEDICINE | Facility: CLINIC | Age: 55
End: 2020-09-21
Payer: MEDICAID

## 2020-09-21 DIAGNOSIS — Z48.02 VISIT FOR SUTURE REMOVAL: Primary | ICD-10-CM

## 2020-09-21 PROCEDURE — 90471 IMMUNIZATION ADMIN: CPT | Mod: PBBFAC,PN

## 2020-09-21 PROCEDURE — 99024 PR POST-OP FOLLOW-UP VISIT: ICD-10-PCS | Mod: ,,, | Performed by: FAMILY MEDICINE

## 2020-09-21 PROCEDURE — 99024 POSTOP FOLLOW-UP VISIT: CPT | Mod: ,,, | Performed by: FAMILY MEDICINE

## 2020-09-21 NOTE — PROGRESS NOTES
Confirmed name and . 2 sutures removed from R shoulder. No sign of infection. Excision site completely healed. Pt tolerated well. All questions answered. Flu shot given. Pt advised to wait in lobby x 15 min. Pt agreed.

## 2020-10-05 ENCOUNTER — PATIENT MESSAGE (OUTPATIENT)
Dept: ADMINISTRATIVE | Facility: HOSPITAL | Age: 55
End: 2020-10-05

## 2020-10-30 ENCOUNTER — PATIENT MESSAGE (OUTPATIENT)
Dept: ADMINISTRATIVE | Facility: HOSPITAL | Age: 55
End: 2020-10-30

## 2021-01-04 ENCOUNTER — PATIENT MESSAGE (OUTPATIENT)
Dept: ADMINISTRATIVE | Facility: HOSPITAL | Age: 56
End: 2021-01-04

## 2021-01-21 DIAGNOSIS — F41.9 ANXIETY: ICD-10-CM

## 2021-01-21 RX ORDER — ALPRAZOLAM 0.5 MG/1
TABLET ORAL
Qty: 30 TABLET | Refills: 0 | Status: SHIPPED | OUTPATIENT
Start: 2021-01-21 | End: 2021-05-14 | Stop reason: SDUPTHER

## 2021-03-03 ENCOUNTER — NURSE TRIAGE (OUTPATIENT)
Dept: ADMINISTRATIVE | Facility: CLINIC | Age: 56
End: 2021-03-03

## 2021-03-03 ENCOUNTER — TELEPHONE (OUTPATIENT)
Dept: FAMILY MEDICINE | Facility: CLINIC | Age: 56
End: 2021-03-03

## 2021-03-11 ENCOUNTER — TELEPHONE (OUTPATIENT)
Dept: FAMILY MEDICINE | Facility: CLINIC | Age: 56
End: 2021-03-11

## 2021-04-06 ENCOUNTER — PATIENT MESSAGE (OUTPATIENT)
Dept: ADMINISTRATIVE | Facility: HOSPITAL | Age: 56
End: 2021-04-06

## 2021-04-26 ENCOUNTER — TELEPHONE (OUTPATIENT)
Dept: FAMILY MEDICINE | Facility: CLINIC | Age: 56
End: 2021-04-26

## 2021-04-30 ENCOUNTER — OFFICE VISIT (OUTPATIENT)
Dept: FAMILY MEDICINE | Facility: CLINIC | Age: 56
End: 2021-04-30
Payer: MEDICAID

## 2021-04-30 VITALS
DIASTOLIC BLOOD PRESSURE: 100 MMHG | WEIGHT: 223 LBS | TEMPERATURE: 98 F | SYSTOLIC BLOOD PRESSURE: 156 MMHG | HEART RATE: 64 BPM | RESPIRATION RATE: 14 BRPM | HEIGHT: 71 IN | BODY MASS INDEX: 31.22 KG/M2

## 2021-04-30 DIAGNOSIS — I10 ESSENTIAL HYPERTENSION: ICD-10-CM

## 2021-04-30 DIAGNOSIS — R07.9 CHEST PAIN, UNSPECIFIED TYPE: Primary | ICD-10-CM

## 2021-04-30 DIAGNOSIS — Z00.00 PREVENTATIVE HEALTH CARE: ICD-10-CM

## 2021-04-30 PROCEDURE — 99999 PR PBB SHADOW E&M-EST. PATIENT-LVL IV: ICD-10-PCS | Mod: PBBFAC,,, | Performed by: INTERNAL MEDICINE

## 2021-04-30 PROCEDURE — 99214 OFFICE O/P EST MOD 30 MIN: CPT | Mod: PBBFAC,PN | Performed by: INTERNAL MEDICINE

## 2021-04-30 PROCEDURE — 99214 OFFICE O/P EST MOD 30 MIN: CPT | Mod: S$PBB,,, | Performed by: INTERNAL MEDICINE

## 2021-04-30 PROCEDURE — 99999 PR PBB SHADOW E&M-EST. PATIENT-LVL IV: CPT | Mod: PBBFAC,,, | Performed by: INTERNAL MEDICINE

## 2021-04-30 PROCEDURE — 99214 PR OFFICE/OUTPT VISIT, EST, LEVL IV, 30-39 MIN: ICD-10-PCS | Mod: S$PBB,,, | Performed by: INTERNAL MEDICINE

## 2021-04-30 RX ORDER — VALSARTAN 160 MG/1
160 TABLET ORAL DAILY
Qty: 30 TABLET | Refills: 2 | Status: SHIPPED | OUTPATIENT
Start: 2021-04-30 | End: 2021-05-14 | Stop reason: SDUPTHER

## 2021-04-30 RX ORDER — ASPIRIN 81 MG/1
81 TABLET ORAL DAILY
COMMUNITY

## 2021-04-30 RX ORDER — PHENOL/SODIUM PHENOLATE
20 AEROSOL, SPRAY (ML) MUCOUS MEMBRANE DAILY
Qty: 30 EACH | Refills: 2 | Status: SHIPPED | OUTPATIENT
Start: 2021-04-30 | End: 2021-05-14

## 2021-04-30 RX ORDER — ESCITALOPRAM OXALATE 10 MG/1
10 TABLET ORAL
COMMUNITY
Start: 2021-03-15 | End: 2021-04-30

## 2021-05-08 ENCOUNTER — LAB VISIT (OUTPATIENT)
Dept: LAB | Facility: HOSPITAL | Age: 56
End: 2021-05-08
Attending: INTERNAL MEDICINE
Payer: MEDICAID

## 2021-05-08 DIAGNOSIS — Z00.00 PREVENTATIVE HEALTH CARE: ICD-10-CM

## 2021-05-08 DIAGNOSIS — I10 ESSENTIAL HYPERTENSION: ICD-10-CM

## 2021-05-08 LAB
ALBUMIN SERPL BCP-MCNC: 3.7 G/DL (ref 3.5–5.2)
ALP SERPL-CCNC: 56 U/L (ref 55–135)
ALT SERPL W/O P-5'-P-CCNC: 18 U/L (ref 10–44)
ANION GAP SERPL CALC-SCNC: 9 MMOL/L (ref 8–16)
AST SERPL-CCNC: 15 U/L (ref 10–40)
BILIRUB SERPL-MCNC: 0.9 MG/DL (ref 0.1–1)
BUN SERPL-MCNC: 19 MG/DL (ref 6–20)
CALCIUM SERPL-MCNC: 9.6 MG/DL (ref 8.7–10.5)
CHLORIDE SERPL-SCNC: 106 MMOL/L (ref 95–110)
CHOLEST SERPL-MCNC: 103 MG/DL (ref 120–199)
CHOLEST/HDLC SERPL: 3 {RATIO} (ref 2–5)
CO2 SERPL-SCNC: 24 MMOL/L (ref 23–29)
COMPLEXED PSA SERPL-MCNC: 2.1 NG/ML (ref 0–4)
CREAT SERPL-MCNC: 1 MG/DL (ref 0.5–1.4)
EST. GFR  (AFRICAN AMERICAN): >60 ML/MIN/1.73 M^2
EST. GFR  (NON AFRICAN AMERICAN): >60 ML/MIN/1.73 M^2
ESTIMATED AVG GLUCOSE: 103 MG/DL (ref 68–131)
GLUCOSE SERPL-MCNC: 98 MG/DL (ref 70–110)
HBA1C MFR BLD: 5.2 % (ref 4–5.6)
HDLC SERPL-MCNC: 34 MG/DL (ref 40–75)
HDLC SERPL: 33 % (ref 20–50)
LDLC SERPL CALC-MCNC: 58.4 MG/DL (ref 63–159)
NONHDLC SERPL-MCNC: 69 MG/DL
POTASSIUM SERPL-SCNC: 4.4 MMOL/L (ref 3.5–5.1)
PROT SERPL-MCNC: 6.9 G/DL (ref 6–8.4)
SODIUM SERPL-SCNC: 139 MMOL/L (ref 136–145)
TRIGL SERPL-MCNC: 53 MG/DL (ref 30–150)
TSH SERPL DL<=0.005 MIU/L-ACNC: 1.61 UIU/ML (ref 0.4–4)

## 2021-05-08 PROCEDURE — 84153 ASSAY OF PSA TOTAL: CPT | Performed by: INTERNAL MEDICINE

## 2021-05-08 PROCEDURE — 86703 HIV-1/HIV-2 1 RESULT ANTBDY: CPT | Performed by: INTERNAL MEDICINE

## 2021-05-08 PROCEDURE — 83036 HEMOGLOBIN GLYCOSYLATED A1C: CPT | Performed by: INTERNAL MEDICINE

## 2021-05-08 PROCEDURE — 36415 COLL VENOUS BLD VENIPUNCTURE: CPT | Mod: PO | Performed by: INTERNAL MEDICINE

## 2021-05-08 PROCEDURE — 80053 COMPREHEN METABOLIC PANEL: CPT | Performed by: INTERNAL MEDICINE

## 2021-05-08 PROCEDURE — 80061 LIPID PANEL: CPT | Performed by: INTERNAL MEDICINE

## 2021-05-08 PROCEDURE — 86803 HEPATITIS C AB TEST: CPT | Performed by: INTERNAL MEDICINE

## 2021-05-08 PROCEDURE — 84443 ASSAY THYROID STIM HORMONE: CPT | Performed by: INTERNAL MEDICINE

## 2021-05-10 ENCOUNTER — PATIENT MESSAGE (OUTPATIENT)
Dept: RESEARCH | Facility: HOSPITAL | Age: 56
End: 2021-05-10

## 2021-05-10 LAB
HCV AB SERPL QL IA: NEGATIVE
HIV 1+2 AB+HIV1 P24 AG SERPL QL IA: NEGATIVE

## 2021-05-14 ENCOUNTER — OFFICE VISIT (OUTPATIENT)
Dept: FAMILY MEDICINE | Facility: CLINIC | Age: 56
End: 2021-05-14
Payer: MEDICAID

## 2021-05-14 VITALS
SYSTOLIC BLOOD PRESSURE: 128 MMHG | OXYGEN SATURATION: 97 % | DIASTOLIC BLOOD PRESSURE: 88 MMHG | HEART RATE: 66 BPM | WEIGHT: 221.31 LBS | HEIGHT: 71 IN | BODY MASS INDEX: 30.98 KG/M2 | TEMPERATURE: 98 F

## 2021-05-14 DIAGNOSIS — F41.9 ANXIETY: ICD-10-CM

## 2021-05-14 DIAGNOSIS — I10 ESSENTIAL HYPERTENSION: Primary | ICD-10-CM

## 2021-05-14 PROCEDURE — 99999 PR PBB SHADOW E&M-EST. PATIENT-LVL III: CPT | Mod: PBBFAC,,, | Performed by: INTERNAL MEDICINE

## 2021-05-14 PROCEDURE — 99214 OFFICE O/P EST MOD 30 MIN: CPT | Mod: S$PBB,,, | Performed by: INTERNAL MEDICINE

## 2021-05-14 PROCEDURE — 99999 PR PBB SHADOW E&M-EST. PATIENT-LVL III: ICD-10-PCS | Mod: PBBFAC,,, | Performed by: INTERNAL MEDICINE

## 2021-05-14 PROCEDURE — 99213 OFFICE O/P EST LOW 20 MIN: CPT | Mod: PBBFAC,PN | Performed by: INTERNAL MEDICINE

## 2021-05-14 PROCEDURE — 99214 PR OFFICE/OUTPT VISIT, EST, LEVL IV, 30-39 MIN: ICD-10-PCS | Mod: S$PBB,,, | Performed by: INTERNAL MEDICINE

## 2021-05-14 RX ORDER — ALPRAZOLAM 0.5 MG/1
0.5 TABLET ORAL 2 TIMES DAILY PRN
Qty: 30 TABLET | Refills: 0 | Status: SHIPPED | OUTPATIENT
Start: 2021-05-14 | End: 2021-08-06 | Stop reason: SDUPTHER

## 2021-05-14 RX ORDER — VALSARTAN 320 MG/1
320 TABLET ORAL DAILY
Qty: 30 TABLET | Refills: 2
Start: 2021-05-14 | End: 2021-12-21 | Stop reason: SDUPTHER

## 2021-07-07 ENCOUNTER — PATIENT MESSAGE (OUTPATIENT)
Dept: ADMINISTRATIVE | Facility: HOSPITAL | Age: 56
End: 2021-07-07

## 2021-08-06 DIAGNOSIS — F41.9 ANXIETY: ICD-10-CM

## 2021-08-06 RX ORDER — ALPRAZOLAM 0.5 MG/1
0.5 TABLET ORAL 2 TIMES DAILY PRN
Qty: 30 TABLET | Refills: 0 | Status: SHIPPED | OUTPATIENT
Start: 2021-08-06 | End: 2021-09-24 | Stop reason: SDUPTHER

## 2021-08-09 ENCOUNTER — PATIENT MESSAGE (OUTPATIENT)
Dept: FAMILY MEDICINE | Facility: CLINIC | Age: 56
End: 2021-08-09

## 2021-08-17 ENCOUNTER — TELEPHONE (OUTPATIENT)
Dept: FAMILY MEDICINE | Facility: CLINIC | Age: 56
End: 2021-08-17

## 2021-08-20 ENCOUNTER — NURSE TRIAGE (OUTPATIENT)
Dept: ADMINISTRATIVE | Facility: CLINIC | Age: 56
End: 2021-08-20

## 2021-08-20 ENCOUNTER — OFFICE VISIT (OUTPATIENT)
Dept: FAMILY MEDICINE | Facility: CLINIC | Age: 56
End: 2021-08-20
Payer: MEDICAID

## 2021-08-20 VITALS
HEART RATE: 88 BPM | RESPIRATION RATE: 18 BRPM | OXYGEN SATURATION: 98 % | SYSTOLIC BLOOD PRESSURE: 136 MMHG | WEIGHT: 219.56 LBS | DIASTOLIC BLOOD PRESSURE: 86 MMHG | BODY MASS INDEX: 30.74 KG/M2 | HEIGHT: 71 IN

## 2021-08-20 DIAGNOSIS — M77.9 TENDONITIS: ICD-10-CM

## 2021-08-20 DIAGNOSIS — F41.9 ANXIETY: Primary | ICD-10-CM

## 2021-08-20 PROCEDURE — 99214 PR OFFICE/OUTPT VISIT, EST, LEVL IV, 30-39 MIN: ICD-10-PCS | Mod: S$PBB,,, | Performed by: INTERNAL MEDICINE

## 2021-08-20 PROCEDURE — 99213 OFFICE O/P EST LOW 20 MIN: CPT | Mod: PBBFAC,PN | Performed by: INTERNAL MEDICINE

## 2021-08-20 PROCEDURE — 99999 PR PBB SHADOW E&M-EST. PATIENT-LVL III: CPT | Mod: PBBFAC,,, | Performed by: INTERNAL MEDICINE

## 2021-08-20 PROCEDURE — 99214 OFFICE O/P EST MOD 30 MIN: CPT | Mod: S$PBB,,, | Performed by: INTERNAL MEDICINE

## 2021-08-20 PROCEDURE — 99999 PR PBB SHADOW E&M-EST. PATIENT-LVL III: ICD-10-PCS | Mod: PBBFAC,,, | Performed by: INTERNAL MEDICINE

## 2021-08-20 RX ORDER — ESCITALOPRAM OXALATE 10 MG/1
10 TABLET ORAL DAILY
Qty: 30 TABLET | Refills: 5 | Status: SHIPPED | OUTPATIENT
Start: 2021-08-20 | End: 2021-12-21

## 2021-08-20 RX ORDER — IBUPROFEN 600 MG/1
600 TABLET ORAL 2 TIMES DAILY
Qty: 14 TABLET | Refills: 0 | Status: SHIPPED | OUTPATIENT
Start: 2021-08-20 | End: 2021-08-27

## 2021-08-25 ENCOUNTER — OFFICE VISIT (OUTPATIENT)
Dept: FAMILY MEDICINE | Facility: CLINIC | Age: 56
End: 2021-08-25
Payer: MEDICAID

## 2021-08-25 VITALS
BODY MASS INDEX: 30.09 KG/M2 | SYSTOLIC BLOOD PRESSURE: 130 MMHG | HEIGHT: 71 IN | WEIGHT: 214.94 LBS | RESPIRATION RATE: 17 BRPM | HEART RATE: 88 BPM | DIASTOLIC BLOOD PRESSURE: 82 MMHG

## 2021-08-25 DIAGNOSIS — F41.9 ANXIETY: Primary | ICD-10-CM

## 2021-08-25 PROCEDURE — 99213 OFFICE O/P EST LOW 20 MIN: CPT | Mod: S$PBB,,, | Performed by: INTERNAL MEDICINE

## 2021-08-25 PROCEDURE — 99214 OFFICE O/P EST MOD 30 MIN: CPT | Mod: PBBFAC,PN | Performed by: INTERNAL MEDICINE

## 2021-08-25 PROCEDURE — 99999 PR PBB SHADOW E&M-EST. PATIENT-LVL IV: CPT | Mod: PBBFAC,,, | Performed by: INTERNAL MEDICINE

## 2021-08-25 PROCEDURE — 99213 PR OFFICE/OUTPT VISIT, EST, LEVL III, 20-29 MIN: ICD-10-PCS | Mod: S$PBB,,, | Performed by: INTERNAL MEDICINE

## 2021-08-25 PROCEDURE — 99999 PR PBB SHADOW E&M-EST. PATIENT-LVL IV: ICD-10-PCS | Mod: PBBFAC,,, | Performed by: INTERNAL MEDICINE

## 2021-09-16 NOTE — TELEPHONE ENCOUNTER
Left message for patient to call back at his earliest convenience    This is a surgical and/or non-medical patient.

## 2021-09-24 DIAGNOSIS — F41.9 ANXIETY: ICD-10-CM

## 2021-09-24 RX ORDER — ALPRAZOLAM 0.5 MG/1
0.5 TABLET ORAL 2 TIMES DAILY PRN
Qty: 30 TABLET | Refills: 0 | Status: SHIPPED | OUTPATIENT
Start: 2021-09-24 | End: 2021-11-10

## 2021-10-04 ENCOUNTER — OFFICE VISIT (OUTPATIENT)
Dept: FAMILY MEDICINE | Facility: CLINIC | Age: 56
End: 2021-10-04
Payer: MEDICAID

## 2021-10-04 VITALS
DIASTOLIC BLOOD PRESSURE: 84 MMHG | OXYGEN SATURATION: 97 % | BODY MASS INDEX: 31.18 KG/M2 | SYSTOLIC BLOOD PRESSURE: 144 MMHG | HEART RATE: 65 BPM | WEIGHT: 222.69 LBS | HEIGHT: 71 IN

## 2021-10-04 DIAGNOSIS — I10 ESSENTIAL HYPERTENSION: ICD-10-CM

## 2021-10-04 DIAGNOSIS — T14.8XXA MUSCLE STRAIN: Primary | ICD-10-CM

## 2021-10-04 PROCEDURE — 99214 PR OFFICE/OUTPT VISIT, EST, LEVL IV, 30-39 MIN: ICD-10-PCS | Mod: S$PBB,,, | Performed by: INTERNAL MEDICINE

## 2021-10-04 PROCEDURE — 99214 OFFICE O/P EST MOD 30 MIN: CPT | Mod: PBBFAC,PO | Performed by: INTERNAL MEDICINE

## 2021-10-04 PROCEDURE — 99999 PR PBB SHADOW E&M-EST. PATIENT-LVL IV: CPT | Mod: PBBFAC,,, | Performed by: INTERNAL MEDICINE

## 2021-10-04 PROCEDURE — 99214 OFFICE O/P EST MOD 30 MIN: CPT | Mod: S$PBB,,, | Performed by: INTERNAL MEDICINE

## 2021-10-04 PROCEDURE — 99999 PR PBB SHADOW E&M-EST. PATIENT-LVL IV: ICD-10-PCS | Mod: PBBFAC,,, | Performed by: INTERNAL MEDICINE

## 2021-11-10 DIAGNOSIS — Z12.11 COLON CANCER SCREENING: ICD-10-CM

## 2021-12-13 ENCOUNTER — PATIENT OUTREACH (OUTPATIENT)
Dept: ADMINISTRATIVE | Facility: HOSPITAL | Age: 56
End: 2021-12-13
Payer: MEDICAID

## 2021-12-14 ENCOUNTER — PATIENT MESSAGE (OUTPATIENT)
Dept: FAMILY MEDICINE | Facility: CLINIC | Age: 56
End: 2021-12-14
Payer: MEDICAID

## 2021-12-17 DIAGNOSIS — F41.9 ANXIETY: ICD-10-CM

## 2021-12-17 RX ORDER — ALPRAZOLAM 0.5 MG/1
TABLET ORAL
Qty: 30 TABLET | Refills: 0 | Status: SHIPPED | OUTPATIENT
Start: 2021-12-17 | End: 2021-12-21 | Stop reason: SDUPTHER

## 2021-12-20 ENCOUNTER — TELEPHONE (OUTPATIENT)
Dept: FAMILY MEDICINE | Facility: CLINIC | Age: 56
End: 2021-12-20
Payer: MEDICAID

## 2021-12-21 ENCOUNTER — OFFICE VISIT (OUTPATIENT)
Dept: FAMILY MEDICINE | Facility: CLINIC | Age: 56
End: 2021-12-21
Payer: MEDICAID

## 2021-12-21 VITALS
WEIGHT: 225.75 LBS | HEART RATE: 60 BPM | DIASTOLIC BLOOD PRESSURE: 88 MMHG | SYSTOLIC BLOOD PRESSURE: 138 MMHG | HEIGHT: 71 IN | BODY MASS INDEX: 31.6 KG/M2

## 2021-12-21 DIAGNOSIS — I10 ESSENTIAL HYPERTENSION: ICD-10-CM

## 2021-12-21 DIAGNOSIS — F41.9 ANXIETY: Primary | ICD-10-CM

## 2021-12-21 PROCEDURE — 99214 PR OFFICE/OUTPT VISIT, EST, LEVL IV, 30-39 MIN: ICD-10-PCS | Mod: S$PBB,,, | Performed by: FAMILY MEDICINE

## 2021-12-21 PROCEDURE — 99213 OFFICE O/P EST LOW 20 MIN: CPT | Mod: PBBFAC,PN | Performed by: FAMILY MEDICINE

## 2021-12-21 PROCEDURE — 4010F PR ACE/ARB THEARPY RXD/TAKEN: ICD-10-PCS | Mod: CPTII,,, | Performed by: FAMILY MEDICINE

## 2021-12-21 PROCEDURE — 99214 OFFICE O/P EST MOD 30 MIN: CPT | Mod: S$PBB,,, | Performed by: FAMILY MEDICINE

## 2021-12-21 PROCEDURE — 99999 PR PBB SHADOW E&M-EST. PATIENT-LVL III: ICD-10-PCS | Mod: PBBFAC,,, | Performed by: FAMILY MEDICINE

## 2021-12-21 PROCEDURE — 4010F ACE/ARB THERAPY RXD/TAKEN: CPT | Mod: CPTII,,, | Performed by: FAMILY MEDICINE

## 2021-12-21 PROCEDURE — 99999 PR PBB SHADOW E&M-EST. PATIENT-LVL III: CPT | Mod: PBBFAC,,, | Performed by: FAMILY MEDICINE

## 2021-12-21 RX ORDER — ESCITALOPRAM OXALATE 10 MG/1
15 TABLET ORAL DAILY
Qty: 135 TABLET | Refills: 3 | Status: SHIPPED | OUTPATIENT
Start: 2021-12-21 | End: 2022-11-22

## 2021-12-21 RX ORDER — ALPRAZOLAM 0.5 MG/1
0.5 TABLET ORAL NIGHTLY PRN
Qty: 30 TABLET | Refills: 2 | Status: SHIPPED | OUTPATIENT
Start: 2021-12-21 | End: 2022-05-16

## 2021-12-21 RX ORDER — VALSARTAN 320 MG/1
320 TABLET ORAL DAILY
Qty: 90 TABLET | Refills: 3 | Status: SHIPPED | OUTPATIENT
Start: 2021-12-21 | End: 2023-02-28 | Stop reason: SDUPTHER

## 2022-05-16 DIAGNOSIS — F41.9 ANXIETY: ICD-10-CM

## 2022-05-16 NOTE — TELEPHONE ENCOUNTER
Previous Justyn pt. Please approve  Last OV 12/21/21  Last refill date 12/21/21 #30 x 2R  Msg sent to pt to sched appt w/ a new PCP

## 2022-05-20 RX ORDER — ALPRAZOLAM 0.5 MG/1
TABLET ORAL
Qty: 30 TABLET | Refills: 0 | Status: SHIPPED | OUTPATIENT
Start: 2022-05-20 | End: 2022-06-17

## 2022-05-30 ENCOUNTER — TELEPHONE (OUTPATIENT)
Dept: FAMILY MEDICINE | Facility: CLINIC | Age: 57
End: 2022-05-30
Payer: MEDICAID

## 2022-05-30 NOTE — TELEPHONE ENCOUNTER
Pt came by clinic on 5/26/22 requesting new PCP for his future refills since Dr Alejandra is retired. Advised pt we would call him to set something up.    Tried to reach pt. No answer. Voice mailbox is full. Unable to leave msg.

## 2022-06-17 ENCOUNTER — OFFICE VISIT (OUTPATIENT)
Dept: FAMILY MEDICINE | Facility: CLINIC | Age: 57
End: 2022-06-17
Payer: MEDICAID

## 2022-06-17 VITALS
HEIGHT: 71 IN | BODY MASS INDEX: 31.89 KG/M2 | HEART RATE: 74 BPM | WEIGHT: 227.75 LBS | OXYGEN SATURATION: 98 % | SYSTOLIC BLOOD PRESSURE: 126 MMHG | DIASTOLIC BLOOD PRESSURE: 80 MMHG

## 2022-06-17 DIAGNOSIS — E66.9 OBESITY (BMI 30-39.9): ICD-10-CM

## 2022-06-17 DIAGNOSIS — F41.1 GENERALIZED ANXIETY DISORDER: Primary | ICD-10-CM

## 2022-06-17 DIAGNOSIS — Z76.89 ENCOUNTER TO ESTABLISH CARE WITH NEW DOCTOR: ICD-10-CM

## 2022-06-17 DIAGNOSIS — F41.8 SITUATIONAL ANXIETY: ICD-10-CM

## 2022-06-17 DIAGNOSIS — R73.01 IMPAIRED FASTING GLUCOSE: ICD-10-CM

## 2022-06-17 DIAGNOSIS — I10 ESSENTIAL HYPERTENSION: ICD-10-CM

## 2022-06-17 DIAGNOSIS — Z01.89 ENCOUNTER FOR LABORATORY TEST: ICD-10-CM

## 2022-06-17 DIAGNOSIS — G47.9 SLEEP DISORDER: ICD-10-CM

## 2022-06-17 DIAGNOSIS — E80.4 GILBERTS SYNDROME: ICD-10-CM

## 2022-06-17 DIAGNOSIS — F40.240 CLAUSTROPHOBIA: ICD-10-CM

## 2022-06-17 DIAGNOSIS — Z02.89 ENCOUNTER FOR COMPLETION OF FORM WITH PATIENT: ICD-10-CM

## 2022-06-17 DIAGNOSIS — Z12.5 PROSTATE CANCER SCREENING: ICD-10-CM

## 2022-06-17 PROCEDURE — 4010F PR ACE/ARB THEARPY RXD/TAKEN: ICD-10-PCS | Mod: CPTII,,, | Performed by: INTERNAL MEDICINE

## 2022-06-17 PROCEDURE — 3079F PR MOST RECENT DIASTOLIC BLOOD PRESSURE 80-89 MM HG: ICD-10-PCS | Mod: CPTII,,, | Performed by: INTERNAL MEDICINE

## 2022-06-17 PROCEDURE — 3074F PR MOST RECENT SYSTOLIC BLOOD PRESSURE < 130 MM HG: ICD-10-PCS | Mod: CPTII,,, | Performed by: INTERNAL MEDICINE

## 2022-06-17 PROCEDURE — 1159F MED LIST DOCD IN RCRD: CPT | Mod: CPTII,,, | Performed by: INTERNAL MEDICINE

## 2022-06-17 PROCEDURE — 99999 PR PBB SHADOW E&M-EST. PATIENT-LVL III: CPT | Mod: PBBFAC,,, | Performed by: INTERNAL MEDICINE

## 2022-06-17 PROCEDURE — 3079F DIAST BP 80-89 MM HG: CPT | Mod: CPTII,,, | Performed by: INTERNAL MEDICINE

## 2022-06-17 PROCEDURE — 99215 PR OFFICE/OUTPT VISIT, EST, LEVL V, 40-54 MIN: ICD-10-PCS | Mod: S$PBB,,, | Performed by: INTERNAL MEDICINE

## 2022-06-17 PROCEDURE — 1160F RVW MEDS BY RX/DR IN RCRD: CPT | Mod: CPTII,,, | Performed by: INTERNAL MEDICINE

## 2022-06-17 PROCEDURE — 3074F SYST BP LT 130 MM HG: CPT | Mod: CPTII,,, | Performed by: INTERNAL MEDICINE

## 2022-06-17 PROCEDURE — 4010F ACE/ARB THERAPY RXD/TAKEN: CPT | Mod: CPTII,,, | Performed by: INTERNAL MEDICINE

## 2022-06-17 PROCEDURE — 3008F BODY MASS INDEX DOCD: CPT | Mod: CPTII,,, | Performed by: INTERNAL MEDICINE

## 2022-06-17 PROCEDURE — 3008F PR BODY MASS INDEX (BMI) DOCUMENTED: ICD-10-PCS | Mod: CPTII,,, | Performed by: INTERNAL MEDICINE

## 2022-06-17 PROCEDURE — 99213 OFFICE O/P EST LOW 20 MIN: CPT | Mod: PBBFAC,PN | Performed by: INTERNAL MEDICINE

## 2022-06-17 PROCEDURE — 99215 OFFICE O/P EST HI 40 MIN: CPT | Mod: S$PBB,,, | Performed by: INTERNAL MEDICINE

## 2022-06-17 PROCEDURE — 1159F PR MEDICATION LIST DOCUMENTED IN MEDICAL RECORD: ICD-10-PCS | Mod: CPTII,,, | Performed by: INTERNAL MEDICINE

## 2022-06-17 PROCEDURE — 99999 PR PBB SHADOW E&M-EST. PATIENT-LVL III: ICD-10-PCS | Mod: PBBFAC,,, | Performed by: INTERNAL MEDICINE

## 2022-06-17 PROCEDURE — 1160F PR REVIEW ALL MEDS BY PRESCRIBER/CLIN PHARMACIST DOCUMENTED: ICD-10-PCS | Mod: CPTII,,, | Performed by: INTERNAL MEDICINE

## 2022-06-17 RX ORDER — TRAZODONE HYDROCHLORIDE 50 MG/1
50 TABLET ORAL NIGHTLY
Qty: 30 TABLET | Refills: 2 | Status: SHIPPED | OUTPATIENT
Start: 2022-06-17 | End: 2022-08-25

## 2022-06-17 RX ORDER — ALPRAZOLAM 0.25 MG/1
TABLET ORAL
Qty: 28 TABLET | Refills: 0 | Status: SHIPPED | OUTPATIENT
Start: 2022-06-17 | End: 2023-08-10

## 2022-06-17 NOTE — LETTER
[facility]  [address]  [place] [place] [address]  Phone: [phone]  Fax: [phone] June 17, 2022    Jcarlos Rebolledo  Formerly named [facility] [place] [facility] [address]      To Whom It May Concern:    Jcarlos Rebolledo is unable to participate in jury duty due to anxiety and claustrophobia.    If you have any questions or concerns, please feel free to call my office.    Sincerely,        Murtaza Moreno MD

## 2022-06-17 NOTE — PATIENT INSTRUCTIONS
Generalized anxiety disorder: Limit caffeine intake with stress reduction and regular exercise as tolerated.  -     traZODone (DESYREL) 50 MG tablet; Take 1 tablet (50 mg total) by mouth every evening.  Dispense: 30 tablet; Refill: 2  -     TSH; Future; Expected date: 06/17/2022    Situational anxiety  -     traZODone (DESYREL) 50 MG tablet; Take 1 tablet (50 mg total) by mouth every evening.  Dispense: 30 tablet; Refill: 2  -     TSH; Future; Expected date: 06/17/2022    Claustrophobia: can use xanax 0.25 mg if needed for this.   -     TSH; Future; Expected date: 06/17/2022    Sleep disorder; exercise after mid-afternoon. Can use xanax 0.25 mg if trazodone 50-75 mg a night fails for sleep.   -     traZODone (DESYREL) 50 MG tablet; Take 1 tablet (50 mg total) by mouth every evening.  Dispense: 30 tablet; Refill: 2  -     ALPRAZolam (XANAX) 0.25 MG tablet; Take 0.25 mg po each night as needed for sleep if trazodone is unsuccessful. Generic  Dispense: 28 tablet; Refill: 0  -     TSH; Future; Expected date: 06/17/2022    Encounter to establish care with new doctor    Essential hypertension; Maintain < 2 Gm Na a day diet, and monitor BP at home; keep a log.  -     Hemoglobin A1C; Future; Expected date: 06/17/2022  -     Lipid Panel; Future; Expected date: 06/17/2022  -     Comprehensive Metabolic Panel; Future; Expected date: 06/17/2022  -     CBC Auto Differential; Future; Expected date: 06/17/2022  -     TSH; Future; Expected date: 06/17/2022  -     Urinalysis; Future; Expected date: 06/17/2022    [place] syndrome; this is a benign entity. Usually from delayed clearance of billirubin  -     Comprehensive Metabolic Panel; Future; Expected date: 06/17/2022    Encounter for laboratory test; reviewed w pt and ordered for f/u.   -     PSA, Screening; Future; Expected date: 06/17/2022  -     Hemoglobin A1C; Future; Expected date: 06/17/2022  -     Lipid Panel; Future; Expected date: 06/17/2022  -     Bilirubin, Direct;  Future; Expected date: 06/17/2022  -     Comprehensive Metabolic Panel; Future; Expected date: 06/17/2022  -     CBC Auto Differential; Future; Expected date: 06/17/2022  -     TSH; Future; Expected date: 06/17/2022  -     Urinalysis; Future; Expected date: 06/17/2022    Prostate cancer screening: PSA ordered. Would obtain yearly.      Impaired fasting glucose; Exercise recommended with weight reduction and low carb diet; we'll follow hemoglobin A1c's with you periodically.  -     Hemoglobin A1C; Future; Expected date: 06/17/2022  -     Comprehensive Metabolic Panel; Future; Expected date: 06/17/2022    Obesity (BMI 30-39.9; Caloric restriction w regular exercise and weight reduction.)  -     Hemoglobin A1C; Future; Expected date: 06/17/2022    Encounter for completion of form: for excuse from jury duty; given a typed letter for excuse medically from jury duty; as required for medical excuse per court form completion of documentation needed.   -     Comprehensive Metabolic Panel; Future; Expected date: 06/17/2022

## 2022-06-17 NOTE — PROGRESS NOTES
Subjective:       Patient ID: Jcarlos Rebolledo is a 56 y.o. male.      Patient here today to establish with me as his new PCP at [facility]. Past medical history and surgical history delineated and noted. Social medical history and family medical history also delineated and noted.  Review of systems obtained at length prior to physical exam being performed.  Medications reviewed as well and addressed.  Labs reviewed and ordered for follow-up as needed.      Chief Complaint: Establish Care    HPI  Patient here to establish care with me as her new PCP at [facility].  Prior patient of Dr. Alejandra who has retired  Generalized anxiety disorder: Limit caffeine intake with stress reduction and regular exercise as tolerated.  -     traZODone (DESYREL) 50 MG tablet; Take 1 tablet (50 mg total) by mouth every evening.  Dispense: 30 tablet; Refill: 2  -     TSH; Future; Expected date: 06/17/2022  Situational anxiety:  Limit caffeine intake a dissipate and stress reduction and include regular exercise as part of the daily routine  -     traZODone (DESYREL) 50 MG tablet; Take 1 tablet (50 mg total) by mouth every evening.  Dispense: 30 tablet; Refill: 2  -     TSH; Future; Expected date: 06/17/2022  Claustrophobia: can use xanax 0.25 mg if needed for this.   -     TSH; Future; Expected date: 06/17/2022  Sleep disorder; exercise after mid-afternoon. Can use xanax 0.25 mg if trazodone 50-75 mg a night fails for sleep.   -     traZODone (DESYREL) 50 MG tablet; Take 1 tablet (50 mg total) by mouth every evening.  Dispense: 30 tablet; Refill: 2  -     ALPRAZolam (XANAX) 0.25 MG tablet; Take 0.25 mg po each night as needed for sleep if trazodone is unsuccessful. Generic  Dispense: 28 tablet; Refill: 0  -     TSH; Future; Expected date: 06/17/2022  Encounter to establish care with new doctor  Essential hypertension; Maintain < 2 Gm Na a day diet, and monitor BP at home; keep a log.  -     Hemoglobin A1C;  "Future; Expected date: 06/17/2022  -     Lipid Panel; Future; Expected date: 06/17/2022  -     Comprehensive Metabolic Panel; Future; Expected date: 06/17/2022  -     CBC Auto Differential; Future; Expected date: 06/17/2022  -     TSH; Future; Expected date: 06/17/2022  -     Urinalysis; Future; Expected date: 06/17/2022  [place] syndrome; this is a benign entity. Usually from delayed clearance of billirubin  -     Comprehensive Metabolic Panel; Future; Expected date: 06/17/2022  Encounter for laboratory test; reviewed w pt and ordered for f/u.   -     PSA, Screening; Future; Expected date: 06/17/2022  -     Hemoglobin A1C; Future; Expected date: 06/17/2022  -     Lipid Panel; Future; Expected date: 06/17/2022  -     Bilirubin, Direct; Future; Expected date: 06/17/2022  -     Comprehensive Metabolic Panel; Future; Expected date: 06/17/2022  -     CBC Auto Differential; Future; Expected date: 06/17/2022  -     TSH; Future; Expected date: 06/17/2022  -     Urinalysis; Future; Expected date: 06/17/2022  Prostate cancer screening: PSA ordered. Would obtain yearly.    Impaired fasting glucose; Exercise recommended with weight reduction and low carb diet; we'll follow hemoglobin A1c's with you periodically.  -     Hemoglobin A1C; Future; Expected date: 06/17/2022  -     Comprehensive Metabolic Panel; Future; Expected date: 06/17/2022  Obesity (BMI 30-39.9; Caloric restriction w regular exercise and weight reduction.)  -     Hemoglobin A1C; Future; Expected date: 06/17/2022  -     Comprehensive Metabolic Panel; Future; Expected date: 06/17/2022  Encounter for completion of form: for excuse from jury duty; given a typed letter for excuse medically from jury duty; as required for medical excuse per court form completion of documentation needed.       Vitals:    06/17/22 1402   BP: 126/80   Pulse: 74   SpO2: 98%   Weight: 103.3 kg (227 lb 11.8 oz)   Height: 5' 11" (1.803 m)       BMI Readings from Last 3 Encounters: "   06/17/22 31.76 kg/m²   12/21/21 31.49 kg/m²   10/04/21 31.06 kg/m²        Wt Readings from Last 3 Encounters:   06/17/22 1402 103.3 kg (227 lb 11.8 oz)   12/21/21 0753 102.4 kg (225 lb 12 oz)   10/04/21 0810 101 kg (222 lb 10.6 oz)        BP Readings from Last 3 Encounters:   06/17/22 126/80   12/21/21 138/88   10/04/21 (!) 144/84        There are no preventive care reminders to display for this patient.     Health Maintenance Due   Topic Date Due    Shingles Vaccine (1 of 2) Never done    TETANUS VACCINE  10/18/2019    COVID-19 Vaccine (3 - Booster for Moderna series) 02/03/2022         Past Medical History:   Diagnosis Date    Anxiety 2/24/2012       Past Surgical History:   Procedure Laterality Date    NO PAST SURGERIES         Social History     Tobacco Use    Smoking status: Never Smoker    Smokeless tobacco: Never Used   Substance Use Topics    Alcohol use: Yes     Alcohol/week: 7.0 standard drinks     Types: 2 Cans of beer, 5 Standard drinks or equivalent per week       Family History   Problem Relation Age of Onset    Thyroid disease Mother     Hypertension Mother     Leukemia Father        Review of patient's allergies indicates:  No Known Allergies    Current Outpatient Medications on File Prior to Visit   Medication Sig Dispense Refill    aspirin (ECOTRIN) 81 MG EC tablet Take 81 mg by mouth once daily.      EScitalopram oxalate (LEXAPRO) 10 MG tablet Take 1.5 tablets (15 mg total) by mouth once daily. 135 tablet 3    valsartan (DIOVAN) 320 MG tablet Take 1 tablet (320 mg total) by mouth once daily. 90 tablet 3    [DISCONTINUED] ALPRAZolam (XANAX) 0.5 MG tablet TAKE 1 TABLET BY MOUTH TWICE DAILY AS NEEDED FOR ANXIETY 30 tablet 0     No current facility-administered medications on file prior to visit.     Review of Systems   Constitutional: Negative for appetite change and unexpected weight change.   HENT: Negative for congestion, postnasal drip, rhinorrhea and sinus pressure.          "Denies seasonal allergies, or perennial allergies   Eyes: Negative for discharge and itching.   Respiratory: Negative for cough, chest tightness, shortness of breath and wheezing.    Cardiovascular: Negative for chest pain, palpitations and leg swelling.   Gastrointestinal: Negative for abdominal pain, blood in stool, constipation, diarrhea, nausea and vomiting.   Endocrine: Negative for polydipsia, polyphagia and polyuria.   Genitourinary: Negative for dysuria and hematuria.   Musculoskeletal: Negative for arthralgias and myalgias.   Skin: Negative for rash.   Allergic/Immunologic: Negative for environmental allergies and food allergies.   Neurological: Negative for tremors, seizures and headaches.   Hematological: Negative for adenopathy. Does not bruise/bleed easily.   Psychiatric/Behavioral: Negative for dysphoric mood. The patient is not nervous/anxious.         Denies anxiety or depression.       Objective:      Vitals:    06/17/22 1402   BP: 126/80   Pulse: 74   SpO2: 98%   Weight: 103.3 kg (227 lb 11.8 oz)   Height: 5' 11" (1.803 m)     Body mass index is 31.76 kg/m².    Physical Exam  Vitals reviewed.   Constitutional:       Appearance: He is well-developed.   HENT:      Head: Normocephalic and atraumatic.   Neck:      Thyroid: No thyromegaly.      Vascular: No carotid bruit.   Cardiovascular:      Rate and Rhythm: Normal rate and regular rhythm.      Heart sounds: Normal heart sounds. No murmur heard.    No gallop.   Pulmonary:      Effort: Pulmonary effort is normal. No respiratory distress.      Breath sounds: Normal breath sounds. No wheezing or rales.   Abdominal:      General: Bowel sounds are normal. There is no distension.      Palpations: Abdomen is soft.      Tenderness: There is no abdominal tenderness. There is no guarding or rebound.   Musculoskeletal:         General: Normal range of motion.      Cervical back: Normal range of motion and neck supple.      Right lower leg: No edema.      Left " lower leg: No edema.   Lymphadenopathy:      Cervical: No cervical adenopathy.   Skin:     Findings: No rash.   Neurological:      Mental Status: He is alert and oriented to person, place, and time.      Comments: Moves all 4 extremities fine.   Psychiatric:         Behavior: Behavior normal.         Thought Content: Thought content normal.         Assessment:       1. Generalized anxiety disorder    2. Situational anxiety    3. Claustrophobia    4. Sleep disorder    5. Encounter to establish care with new doctor    6. Essential hypertension    7. [place] syndrome    8. Encounter for laboratory test    9. Prostate cancer screening    10. Impaired fasting glucose    11. Obesity (BMI 30-39.9)        Plan:       Generalized anxiety disorder: Limit caffeine intake with stress reduction and regular exercise as tolerated.  -     traZODone (DESYREL) 50 MG tablet; Take 1 tablet (50 mg total) by mouth every evening.  Dispense: 30 tablet; Refill: 2  -     TSH; Future; Expected date: 06/17/2022    Situational anxiety  -     traZODone (DESYREL) 50 MG tablet; Take 1 tablet (50 mg total) by mouth every evening.  Dispense: 30 tablet; Refill: 2  -     TSH; Future; Expected date: 06/17/2022    Claustrophobia: can use xanax 0.25 mg if needed for this.   -     TSH; Future; Expected date: 06/17/2022    Sleep disorder; exercise after mid-afternoon. Can use xanax 0.25 mg if trazodone 50-75 mg a night fails for sleep.   -     traZODone (DESYREL) 50 MG tablet; Take 1 tablet (50 mg total) by mouth every evening.  Dispense: 30 tablet; Refill: 2  -     ALPRAZolam (XANAX) 0.25 MG tablet; Take 0.25 mg po each night as needed for sleep if trazodone is unsuccessful. Generic  Dispense: 28 tablet; Refill: 0  -     TSH; Future; Expected date: 06/17/2022    Encounter to establish care with new doctor    Essential hypertension; Maintain < 2 Gm Na a day diet, and monitor BP at home; keep a log.  -     Hemoglobin A1C; Future; Expected date:  06/17/2022  -     Lipid Panel; Future; Expected date: 06/17/2022  -     Comprehensive Metabolic Panel; Future; Expected date: 06/17/2022  -     CBC Auto Differential; Future; Expected date: 06/17/2022  -     TSH; Future; Expected date: 06/17/2022  -     Urinalysis; Future; Expected date: 06/17/2022    [place] syndrome; this is a benign entity. Usually from delayed clearance of billirubin  -     Comprehensive Metabolic Panel; Future; Expected date: 06/17/2022    Encounter for laboratory test; reviewed w pt and ordered for f/u.   -     PSA, Screening; Future; Expected date: 06/17/2022  -     Hemoglobin A1C; Future; Expected date: 06/17/2022  -     Lipid Panel; Future; Expected date: 06/17/2022  -     Bilirubin, Direct; Future; Expected date: 06/17/2022  -     Comprehensive Metabolic Panel; Future; Expected date: 06/17/2022  -     CBC Auto Differential; Future; Expected date: 06/17/2022  -     TSH; Future; Expected date: 06/17/2022  -     Urinalysis; Future; Expected date: 06/17/2022    Prostate cancer screening: PSA ordered. Would obtain yearly.      Impaired fasting glucose; Exercise recommended with weight reduction and low carb diet; we'll follow hemoglobin A1c's with you periodically.  -     Hemoglobin A1C; Future; Expected date: 06/17/2022  -     Comprehensive Metabolic Panel; Future; Expected date: 06/17/2022    Obesity (BMI 30-39.9; Caloric restriction w regular exercise and weight reduction.)  -     Hemoglobin A1C; Future; Expected date: 06/17/2022  -     Comprehensive Metabolic Panel; Future; Expected date: 06/17/2022    Encounter for completion of form: for excuse from jury duty; given a typed letter for excuse medically from jury duty; as required for medical excuse per court form completion of documentation needed.

## 2022-07-25 ENCOUNTER — LAB VISIT (OUTPATIENT)
Dept: LAB | Facility: HOSPITAL | Age: 57
End: 2022-07-25
Attending: INTERNAL MEDICINE
Payer: MEDICAID

## 2022-07-25 DIAGNOSIS — G47.9 SLEEP DISORDER: ICD-10-CM

## 2022-07-25 DIAGNOSIS — Z01.89 ENCOUNTER FOR LABORATORY TEST: ICD-10-CM

## 2022-07-25 DIAGNOSIS — E66.9 OBESITY (BMI 30-39.9): ICD-10-CM

## 2022-07-25 DIAGNOSIS — I10 ESSENTIAL HYPERTENSION: ICD-10-CM

## 2022-07-25 DIAGNOSIS — E80.4 GILBERTS SYNDROME: ICD-10-CM

## 2022-07-25 DIAGNOSIS — F41.1 GENERALIZED ANXIETY DISORDER: ICD-10-CM

## 2022-07-25 DIAGNOSIS — R73.01 IMPAIRED FASTING GLUCOSE: ICD-10-CM

## 2022-07-25 DIAGNOSIS — F41.8 SITUATIONAL ANXIETY: ICD-10-CM

## 2022-07-25 DIAGNOSIS — F40.240 CLAUSTROPHOBIA: ICD-10-CM

## 2022-07-25 LAB
ALBUMIN SERPL BCP-MCNC: 4.1 G/DL (ref 3.5–5.2)
ALP SERPL-CCNC: 59 U/L (ref 55–135)
ALT SERPL W/O P-5'-P-CCNC: 19 U/L (ref 10–44)
ANION GAP SERPL CALC-SCNC: 7 MMOL/L (ref 8–16)
AST SERPL-CCNC: 15 U/L (ref 10–40)
BASOPHILS # BLD AUTO: 0.05 K/UL (ref 0–0.2)
BASOPHILS NFR BLD: 1.1 % (ref 0–1.9)
BILIRUB DIRECT SERPL-MCNC: 0.3 MG/DL (ref 0.1–0.3)
BILIRUB SERPL-MCNC: 0.9 MG/DL (ref 0.1–1)
BUN SERPL-MCNC: 19 MG/DL (ref 6–20)
CALCIUM SERPL-MCNC: 9.5 MG/DL (ref 8.7–10.5)
CHLORIDE SERPL-SCNC: 103 MMOL/L (ref 95–110)
CHOLEST SERPL-MCNC: 144 MG/DL (ref 120–199)
CHOLEST/HDLC SERPL: 2.8 {RATIO} (ref 2–5)
CO2 SERPL-SCNC: 27 MMOL/L (ref 23–29)
COMPLEXED PSA SERPL-MCNC: 2.1 NG/ML (ref 0–4)
CREAT SERPL-MCNC: 1.1 MG/DL (ref 0.5–1.4)
DIFFERENTIAL METHOD: NORMAL
EOSINOPHIL # BLD AUTO: 0.2 K/UL (ref 0–0.5)
EOSINOPHIL NFR BLD: 4.8 % (ref 0–8)
ERYTHROCYTE [DISTWIDTH] IN BLOOD BY AUTOMATED COUNT: 13.3 % (ref 11.5–14.5)
EST. GFR  (AFRICAN AMERICAN): >60 ML/MIN/1.73 M^2
EST. GFR  (NON AFRICAN AMERICAN): >60 ML/MIN/1.73 M^2
ESTIMATED AVG GLUCOSE: 108 MG/DL (ref 68–131)
GLUCOSE SERPL-MCNC: 111 MG/DL (ref 70–110)
HBA1C MFR BLD: 5.4 % (ref 4–5.6)
HCT VFR BLD AUTO: 47 % (ref 40–54)
HDLC SERPL-MCNC: 52 MG/DL (ref 40–75)
HDLC SERPL: 36.1 % (ref 20–50)
HGB BLD-MCNC: 15.5 G/DL (ref 14–18)
IMM GRANULOCYTES # BLD AUTO: 0 K/UL (ref 0–0.04)
IMM GRANULOCYTES NFR BLD AUTO: 0 % (ref 0–0.5)
LDLC SERPL CALC-MCNC: 83.4 MG/DL (ref 63–159)
LYMPHOCYTES # BLD AUTO: 1.3 K/UL (ref 1–4.8)
LYMPHOCYTES NFR BLD: 29.7 % (ref 18–48)
MCH RBC QN AUTO: 30.4 PG (ref 27–31)
MCHC RBC AUTO-ENTMCNC: 33 G/DL (ref 32–36)
MCV RBC AUTO: 92 FL (ref 82–98)
MONOCYTES # BLD AUTO: 0.4 K/UL (ref 0.3–1)
MONOCYTES NFR BLD: 9.4 % (ref 4–15)
NEUTROPHILS # BLD AUTO: 2.4 K/UL (ref 1.8–7.7)
NEUTROPHILS NFR BLD: 55 % (ref 38–73)
NONHDLC SERPL-MCNC: 92 MG/DL
NRBC BLD-RTO: 0 /100 WBC
PLATELET # BLD AUTO: 252 K/UL (ref 150–450)
PMV BLD AUTO: 11.1 FL (ref 9.2–12.9)
POTASSIUM SERPL-SCNC: 4.9 MMOL/L (ref 3.5–5.1)
PROT SERPL-MCNC: 6.8 G/DL (ref 6–8.4)
RBC # BLD AUTO: 5.1 M/UL (ref 4.6–6.2)
SODIUM SERPL-SCNC: 137 MMOL/L (ref 136–145)
TRIGL SERPL-MCNC: 43 MG/DL (ref 30–150)
TSH SERPL DL<=0.005 MIU/L-ACNC: 1.07 UIU/ML (ref 0.4–4)
WBC # BLD AUTO: 4.35 K/UL (ref 3.9–12.7)

## 2022-07-25 PROCEDURE — 80053 COMPREHEN METABOLIC PANEL: CPT | Performed by: INTERNAL MEDICINE

## 2022-07-25 PROCEDURE — 83036 HEMOGLOBIN GLYCOSYLATED A1C: CPT | Performed by: INTERNAL MEDICINE

## 2022-07-25 PROCEDURE — 84443 ASSAY THYROID STIM HORMONE: CPT | Performed by: INTERNAL MEDICINE

## 2022-07-25 PROCEDURE — 80061 LIPID PANEL: CPT | Performed by: INTERNAL MEDICINE

## 2022-07-25 PROCEDURE — 82248 BILIRUBIN DIRECT: CPT | Performed by: INTERNAL MEDICINE

## 2022-07-25 PROCEDURE — 84153 ASSAY OF PSA TOTAL: CPT | Performed by: INTERNAL MEDICINE

## 2022-07-25 PROCEDURE — 36415 COLL VENOUS BLD VENIPUNCTURE: CPT | Mod: PN | Performed by: INTERNAL MEDICINE

## 2022-07-25 PROCEDURE — 85025 COMPLETE CBC W/AUTO DIFF WBC: CPT | Performed by: INTERNAL MEDICINE

## 2022-08-25 ENCOUNTER — OFFICE VISIT (OUTPATIENT)
Dept: FAMILY MEDICINE | Facility: CLINIC | Age: 57
End: 2022-08-25
Payer: MEDICAID

## 2022-08-25 VITALS
HEIGHT: 71 IN | DIASTOLIC BLOOD PRESSURE: 80 MMHG | OXYGEN SATURATION: 98 % | SYSTOLIC BLOOD PRESSURE: 124 MMHG | HEART RATE: 61 BPM | WEIGHT: 233.25 LBS | BODY MASS INDEX: 32.65 KG/M2

## 2022-08-25 DIAGNOSIS — I10 ESSENTIAL HYPERTENSION: Primary | ICD-10-CM

## 2022-08-25 DIAGNOSIS — F41.1 GENERALIZED ANXIETY DISORDER: ICD-10-CM

## 2022-08-25 DIAGNOSIS — F41.8 SITUATIONAL ANXIETY: ICD-10-CM

## 2022-08-25 DIAGNOSIS — E78.5 DYSLIPIDEMIA: ICD-10-CM

## 2022-08-25 DIAGNOSIS — E78.49 OTHER HYPERLIPIDEMIA: ICD-10-CM

## 2022-08-25 DIAGNOSIS — G47.9 SLEEP DISORDER: ICD-10-CM

## 2022-08-25 DIAGNOSIS — Z01.89 ENCOUNTER FOR LABORATORY TEST: ICD-10-CM

## 2022-08-25 DIAGNOSIS — R73.01 IMPAIRED FASTING GLUCOSE: ICD-10-CM

## 2022-08-25 DIAGNOSIS — E66.9 CLASS 1 OBESITY WITH SERIOUS COMORBIDITY AND BODY MASS INDEX (BMI) OF 32.0 TO 32.9 IN ADULT, UNSPECIFIED OBESITY TYPE: ICD-10-CM

## 2022-08-25 DIAGNOSIS — Z12.5 PROSTATE CANCER SCREENING: ICD-10-CM

## 2022-08-25 DIAGNOSIS — E80.4 GILBERTS SYNDROME: ICD-10-CM

## 2022-08-25 PROCEDURE — 3079F PR MOST RECENT DIASTOLIC BLOOD PRESSURE 80-89 MM HG: ICD-10-PCS | Mod: CPTII,,, | Performed by: INTERNAL MEDICINE

## 2022-08-25 PROCEDURE — 4010F PR ACE/ARB THEARPY RXD/TAKEN: ICD-10-PCS | Mod: CPTII,,, | Performed by: INTERNAL MEDICINE

## 2022-08-25 PROCEDURE — 99214 OFFICE O/P EST MOD 30 MIN: CPT | Mod: S$PBB,,, | Performed by: INTERNAL MEDICINE

## 2022-08-25 PROCEDURE — 99999 PR PBB SHADOW E&M-EST. PATIENT-LVL III: CPT | Mod: PBBFAC,,, | Performed by: INTERNAL MEDICINE

## 2022-08-25 PROCEDURE — 3079F DIAST BP 80-89 MM HG: CPT | Mod: CPTII,,, | Performed by: INTERNAL MEDICINE

## 2022-08-25 PROCEDURE — 4010F ACE/ARB THERAPY RXD/TAKEN: CPT | Mod: CPTII,,, | Performed by: INTERNAL MEDICINE

## 2022-08-25 PROCEDURE — 3008F BODY MASS INDEX DOCD: CPT | Mod: CPTII,,, | Performed by: INTERNAL MEDICINE

## 2022-08-25 PROCEDURE — 3074F SYST BP LT 130 MM HG: CPT | Mod: CPTII,,, | Performed by: INTERNAL MEDICINE

## 2022-08-25 PROCEDURE — 1160F PR REVIEW ALL MEDS BY PRESCRIBER/CLIN PHARMACIST DOCUMENTED: ICD-10-PCS | Mod: CPTII,,, | Performed by: INTERNAL MEDICINE

## 2022-08-25 PROCEDURE — 3044F HG A1C LEVEL LT 7.0%: CPT | Mod: CPTII,,, | Performed by: INTERNAL MEDICINE

## 2022-08-25 PROCEDURE — 99214 PR OFFICE/OUTPT VISIT, EST, LEVL IV, 30-39 MIN: ICD-10-PCS | Mod: S$PBB,,, | Performed by: INTERNAL MEDICINE

## 2022-08-25 PROCEDURE — 1160F RVW MEDS BY RX/DR IN RCRD: CPT | Mod: CPTII,,, | Performed by: INTERNAL MEDICINE

## 2022-08-25 PROCEDURE — 1159F MED LIST DOCD IN RCRD: CPT | Mod: CPTII,,, | Performed by: INTERNAL MEDICINE

## 2022-08-25 PROCEDURE — 99213 OFFICE O/P EST LOW 20 MIN: CPT | Mod: PBBFAC,PN | Performed by: INTERNAL MEDICINE

## 2022-08-25 PROCEDURE — 1159F PR MEDICATION LIST DOCUMENTED IN MEDICAL RECORD: ICD-10-PCS | Mod: CPTII,,, | Performed by: INTERNAL MEDICINE

## 2022-08-25 PROCEDURE — 3074F PR MOST RECENT SYSTOLIC BLOOD PRESSURE < 130 MM HG: ICD-10-PCS | Mod: CPTII,,, | Performed by: INTERNAL MEDICINE

## 2022-08-25 PROCEDURE — 3008F PR BODY MASS INDEX (BMI) DOCUMENTED: ICD-10-PCS | Mod: CPTII,,, | Performed by: INTERNAL MEDICINE

## 2022-08-25 PROCEDURE — 3044F PR MOST RECENT HEMOGLOBIN A1C LEVEL <7.0%: ICD-10-PCS | Mod: CPTII,,, | Performed by: INTERNAL MEDICINE

## 2022-08-25 PROCEDURE — 99999 PR PBB SHADOW E&M-EST. PATIENT-LVL III: ICD-10-PCS | Mod: PBBFAC,,, | Performed by: INTERNAL MEDICINE

## 2022-08-25 RX ORDER — TRAZODONE HYDROCHLORIDE 50 MG/1
TABLET ORAL
Qty: 50 TABLET | Refills: 1 | Status: SHIPPED | OUTPATIENT
Start: 2022-08-25 | End: 2022-10-13

## 2022-08-25 NOTE — PROGRESS NOTES
Subjective:       Patient ID: Jcarlos Rebolledo is a 57 y.o. male.    Chief Complaint: Follow-up    HPI:  Here today for reassessment and go over his lab work.  Essential hypertension; Maintain < 2 Gm Na a day diet, and monitor BP at home; keep a log for office review.  Blood pressure at home has been averaging around 125/80.  Here manually he is 124/80..  Generalized anxiety disorder: Limit caffeine intake with stress reduction and regular exercise as tolerated.  Increase trazodone from 50 mg 75 mg per night as needed for sleep.  Limit caffeine intake.  Exercise after mid afternoon and turn down blue light exposure after mid afternoon.  Continue escitalopram at 10 mg 1 and half tablets daily or 15 mg per day.  TSH 1.067  -     traZODone (DESYREL) 50 MG tablet; 75 mg po nightly as needed for sleep.  Dispense: 50 tablet; Refill: 1  Situational anxiety; Limit caffeine intake with stress reduction and regular exercise as tolerated.  Patient has Xanax to use as needed for sleep if trazodone fails.  -     traZODone (DESYREL) 50 MG tablet; 75 mg po nightly as needed for sleep.  Dispense: 50 tablet; Refill: 1  Impaired fasting glucose; Limit caffeine intake with stress reduction and regular exercise as tolerated.  Impaired fasting blood sugar at 111.  Hemoglobin A1c 5.4.  -     Hemoglobin A1C; Future; Expected date: 08/25/2022  -     Comprehensive Metabolic Panel; Future; Expected date: 08/25/2022  Class 1 obesity with serious comorbidity and body mass index (BMI) of 32.0 to 32.9 in adult, unspecified obesity type: Caloric restriction w regular exercise and weight reduction.  Low-fat low-salt low carb diet  Sleep disorder; increase trazodone to 75 mg a night as needed for sleep; after 5 nightt can increase to 100 mg a day if needed for sleep.   -     traZODone (DESYREL) 50 MG tablet; 75 mg po nightly as needed for sleep.  Dispense: 50 tablet; Refill: 1  [place] syndrome  -     Comprehensive Metabolic Panel; Future;  "Expected date: 2022  Encounter for laboratory test:  Labs discussed and reviewed with patient at length in ordered for follow-up  -     Comprehensive Metabolic Panel; Future; Expected date: 2022  Other hyperlipidemia: Maintain low fat high fiber diet, exercise regularly. Weight reduction where indicated.  -     Comprehensive Metabolic Panel; Future; Expected date: 2022  -     Lipid Panel; Future; Expected date: 2022  Dyslipidemia: increase aerobic activity.   Prostate cancer screenin2022 with PSA 2.1; 1 year ago was 2.1. We  will repeat in 12 months      Review of Systems   Constitutional: Negative for appetite change and unexpected weight change.   HENT: Negative for congestion, postnasal drip, rhinorrhea and sinus pressure.         Denies seasonal allergies, or perennial allergies   Eyes: Negative for discharge and itching.   Respiratory: Negative for cough, chest tightness, shortness of breath and wheezing.    Cardiovascular: Negative for chest pain, palpitations and leg swelling.   Gastrointestinal: Negative for abdominal pain, blood in stool, constipation, diarrhea, nausea and vomiting.   Endocrine: Negative for polydipsia, polyphagia and polyuria.   Genitourinary: Negative for dysuria and hematuria.   Musculoskeletal: Negative for arthralgias and myalgias.   Skin: Negative for rash.   Allergic/Immunologic: Negative for environmental allergies and food allergies.   Neurological: Negative for tremors, seizures and headaches.   Hematological: Negative for adenopathy. Does not bruise/bleed easily.   Psychiatric/Behavioral: Negative for dysphoric mood. The patient is not nervous/anxious.         Denies anxiety or depression.      Objective:        Vitals:    22 1326   BP: 124/80   Pulse: 61   SpO2: 98%   Weight: 105.8 kg (233 lb 4 oz)   Height: 5' 11" (1.803 m)       BMI Readings from Last 3 Encounters:   22 32.53 kg/m²   22 31.76 kg/m²   21 31.49 kg/m²    "     Wt Readings from Last 3 Encounters:   08/25/22 1326 105.8 kg (233 lb 4 oz)   06/17/22 1402 103.3 kg (227 lb 11.8 oz)   12/21/21 0753 102.4 kg (225 lb 12 oz)        BP Readings from Last 3 Encounters:   08/25/22 124/80   06/17/22 126/80   12/21/21 138/88        There are no preventive care reminders to display for this patient.     Health Maintenance Due   Topic Date Due    Shingles Vaccine (1 of 2) Never done    TETANUS VACCINE  10/18/2019    COVID-19 Vaccine (3 - Booster for Moderna series) 02/03/2022         Past Medical History:   Diagnosis Date    Anxiety 2/24/2012       Past Surgical History:   Procedure Laterality Date    NO PAST SURGERIES         Social History     Tobacco Use    Smoking status: Never Smoker    Smokeless tobacco: Never Used   Substance Use Topics    Alcohol use: Yes     Alcohol/week: 7.0 standard drinks     Types: 2 Cans of beer, 5 Standard drinks or equivalent per week       Family History   Problem Relation Age of Onset    Thyroid disease Mother     Hypertension Mother     Leukemia Father        Review of patient's allergies indicates:  No Known Allergies    Current Outpatient Medications on File Prior to Visit   Medication Sig Dispense Refill    aspirin (ECOTRIN) 81 MG EC tablet Take 81 mg by mouth once daily.      EScitalopram oxalate (LEXAPRO) 10 MG tablet Take 1.5 tablets (15 mg total) by mouth once daily. 135 tablet 3    valsartan (DIOVAN) 320 MG tablet Take 1 tablet (320 mg total) by mouth once daily. 90 tablet 3    ALPRAZolam (XANAX) 0.25 MG tablet Take 0.25 mg po each night as needed for sleep if trazodone is unsuccessful. Generic (Patient not taking: Reported on 8/25/2022) 28 tablet 0    [DISCONTINUED] traZODone (DESYREL) 50 MG tablet Take 1 tablet (50 mg total) by mouth every evening. (Patient not taking: Reported on 8/25/2022) 30 tablet 2     No current facility-administered medications on file prior to visit.       Physical Exam  Vitals reviewed.   Constitutional:        Appearance: He is well-developed.   HENT:      Head: Normocephalic and atraumatic.   Neck:      Thyroid: No thyromegaly.      Vascular: No carotid bruit.   Cardiovascular:      Rate and Rhythm: Normal rate and regular rhythm.      Heart sounds: Normal heart sounds. No murmur heard.    No gallop.   Pulmonary:      Effort: Pulmonary effort is normal. No respiratory distress.      Breath sounds: Normal breath sounds. No wheezing or rales.   Abdominal:      General: Bowel sounds are normal. There is no distension.      Palpations: Abdomen is soft.      Tenderness: There is no abdominal tenderness. There is no guarding or rebound.   Musculoskeletal:         General: Normal range of motion.      Cervical back: Normal range of motion and neck supple.      Right lower leg: No edema.      Left lower leg: No edema.   Lymphadenopathy:      Cervical: No cervical adenopathy.   Skin:     Findings: No rash.   Neurological:      Mental Status: He is alert and oriented to person, place, and time.      Comments: Moves all 4 extremities fine.   Psychiatric:         Behavior: Behavior normal.         Thought Content: Thought content normal.         Assessment:       1. Essential hypertension    2. Generalized anxiety disorder    3. Situational anxiety    4. Impaired fasting glucose    5. Class 1 obesity with serious comorbidity and body mass index (BMI) of 32.0 to 32.9 in adult, unspecified obesity type    6. Sleep disorder    7. [place] syndrome    8. Encounter for laboratory test    9. Other hyperlipidemia    10. Dyslipidemia        Plan:       Essential hypertension; Maintain < 2 Gm Na a day diet, and monitor BP at home; keep a log for office review.  Blood pressure at home has been averaging around 125/80.  Here manually he is 124/80..    Generalized anxiety disorder: Limit caffeine intake with stress reduction and regular exercise as tolerated.  Increase trazodone from 50 mg 75 mg per night as needed for sleep.  Limit caffeine  intake.  Exercise after mid afternoon and turn down blue light exposure after mid afternoon.  Continue escitalopram at 10 mg 1 and half tablets daily or 15 mg per day.  TSH 1.067  -     traZODone (DESYREL) 50 MG tablet; 75 mg po nightly as needed for sleep.  Dispense: 50 tablet; Refill: 1    Situational anxiety; Limit caffeine intake with stress reduction and regular exercise as tolerated.  Patient has Xanax to use as needed for sleep if trazodone fails.  -     traZODone (DESYREL) 50 MG tablet; 75 mg po nightly as needed for sleep.  Dispense: 50 tablet; Refill: 1    Impaired fasting glucose; Limit caffeine intake with stress reduction and regular exercise as tolerated.  Impaired fasting blood sugar at 111.  Hemoglobin A1c 5.4.  -     Hemoglobin A1C; Future; Expected date: 2022  -     Comprehensive Metabolic Panel; Future; Expected date: 2022    Class 1 obesity with serious comorbidity and body mass index (BMI) of 32.0 to 32.9 in adult, unspecified obesity type: Caloric restriction w regular exercise and weight reduction.    Sleep disorder; increase trazodone to 75 mg a night as needed for sleep; after 5 nightt can increase to 100 mg a day if needed for sleep.   -     traZODone (DESYREL) 50 MG tablet; 75 mg po nightly as needed for sleep.  Dispense: 50 tablet; Refill: 1    [place] syndrome  -     Comprehensive Metabolic Panel; Future; Expected date: 2022    Encounter for laboratory test  -     Comprehensive Metabolic Panel; Future; Expected date: 2022    Other hyperlipidemia: Maintain low fat high fiber diet, exercise regularly. Weight reduction where indicated.  -     Comprehensive Metabolic Panel; Future; Expected date: 2022  -     Lipid Panel; Future; Expected date: 2022    Dyslipidemia: increase aerobic activity.     Prostate cancer screenin2022 with PSA 2.1; 1 year ago was 2.1. We  will repeat in 12 months

## 2022-08-25 NOTE — PATIENT INSTRUCTIONS
Essential hypertension; Maintain < 2 Gm Na a day diet, and monitor BP at home; keep a log.    Generalized anxiety disorder: Limit caffeine intake with stress reduction and regular exercise as tolerated.  -     traZODone (DESYREL) 50 MG tablet; 75 mg po nightly as needed for sleep.  Dispense: 50 tablet; Refill: 1    Situational anxiety; Limit caffeine intake with stress reduction and regular exercise as tolerated.  -     traZODone (DESYREL) 50 MG tablet; 75 mg po nightly as needed for sleep.  Dispense: 50 tablet; Refill: 1    Impaired fasting glucose; Limit caffeine intake with stress reduction and regular exercise as tolerated.  -     Hemoglobin A1C; Future; Expected date: 08/25/2022  -     Comprehensive Metabolic Panel; Future; Expected date: 08/25/2022    Class 1 obesity with serious comorbidity and body mass index (BMI) of 32.0 to 32.9 in adult, unspecified obesity type: Caloric restriction w regular exercise and weight reduction.    Sleep disorder; increase trazodone to 75 mg a night as needed for sleep; after 5 nightt can increase to 100 mg a day if needed for sleep.   -     traZODone (DESYREL) 50 MG tablet; 75 mg po nightly as needed for sleep.  Dispense: 50 tablet; Refill: 1    [place] syndrome  -     Comprehensive Metabolic Panel; Future; Expected date: 08/25/2022    Encounter for laboratory test  -     Comprehensive Metabolic Panel; Future; Expected date: 08/25/2022    Other hyperlipidemia: Maintain low fat high fiber diet, exercise regularly. Weight reduction where indicated.  -     Comprehensive Metabolic Panel; Future; Expected date: 08/25/2022  -     Lipid Panel; Future; Expected date: 08/25/2022    Dyslipidemia: increase aerobic activity.

## 2022-10-11 DIAGNOSIS — G47.9 SLEEP DISORDER: ICD-10-CM

## 2022-10-11 DIAGNOSIS — F41.1 GENERALIZED ANXIETY DISORDER: ICD-10-CM

## 2022-10-11 DIAGNOSIS — F41.8 SITUATIONAL ANXIETY: ICD-10-CM

## 2022-10-11 NOTE — TELEPHONE ENCOUNTER
Refill Routing Note   Medication(s) are not appropriate for processing by [facility] for the following reason(s):      - Medication is a new start (<3 months)    [facility] action(s):  Defer          Medication reconciliation completed: No     Appointments  past 12m or future 3m with PCP    Date Provider   Last Visit   8/25/2022 Murtaza Moreno MD   Next Visit   12/28/2022 Murtaza Moreno MD   ED visits in past 90 days: 0        Note composed:1:18 PM 10/11/2022

## 2022-10-11 NOTE — TELEPHONE ENCOUNTER
No new care gaps identified.  [facility] Embedded Care Gaps. Reference number: [id number]. 10/11/2022   9:18:55 AM SAKSHIT

## 2022-10-13 RX ORDER — TRAZODONE HYDROCHLORIDE 50 MG/1
75 TABLET ORAL NIGHTLY PRN
Qty: 135 TABLET | Refills: 0 | Status: SHIPPED | OUTPATIENT
Start: 2022-10-13 | End: 2023-01-30

## 2022-12-13 ENCOUNTER — TELEPHONE (OUTPATIENT)
Dept: FAMILY MEDICINE | Facility: CLINIC | Age: 57
End: 2022-12-13
Payer: MEDICAID

## 2022-12-13 NOTE — TELEPHONE ENCOUNTER
----- Message from Darlene Madden sent at 12/13/2022  3:32 PM CST -----  Regarding: pt call back  Name of Who is Calling: BRAULIO SCOTT [[id number]] Jessica (Wife)      What is the request in detail: Pt's wife called requesting to schedule an appt for the week of the 19th. Wife stated he has been having lower back pain. Please advise!        Can the clinic reply by [facility]: NO        What Number to [facility] Back [facility]: [phone]

## 2022-12-14 ENCOUNTER — TELEPHONE (OUTPATIENT)
Dept: FAMILY MEDICINE | Facility: CLINIC | Age: 57
End: 2022-12-14
Payer: MEDICAID

## 2022-12-14 NOTE — TELEPHONE ENCOUNTER
----- Message from Caren Meier LPN sent at 12/13/2022  5:19 PM CST -----  Contact: WifeRandi    ----- Message -----  From: Jerica Landry  Sent: 12/13/2022   4:42 PM CST  To: Tiffanie CRUZ Staff    Type:  Sooner Apoointment Request    Caller is requesting a sooner appointment.  Caller declined first available appointment listed below.  Caller will not accept being placed on the waitlist and is requesting a message be sent to doctor.  Name of Caller:Nav Bryan  When is the first available appointment?12/28  Would the patient rather a call back or a response via [facility]? Call  Best Call Back Number: [phone]  Additional Information: Please call if sooner appt can be scheduled. If no answer, please leave msg.

## 2023-02-20 ENCOUNTER — PATIENT MESSAGE (OUTPATIENT)
Dept: FAMILY MEDICINE | Facility: CLINIC | Age: 58
End: 2023-02-20
Payer: MEDICAID

## 2023-02-23 ENCOUNTER — TELEPHONE (OUTPATIENT)
Dept: UROLOGY | Facility: CLINIC | Age: 58
End: 2023-02-23
Payer: MEDICAID

## 2023-02-24 NOTE — TELEPHONE ENCOUNTER
----- Message from Chelsi Patino sent at 2/23/2023  2:05 PM CST -----  Contact: wife  Type:  RX Refill Request    Who Called:  Wife  Refill or New Rx:  refill  RX Name and Strength:  valsartan (DIOVAN) 320 MG tablet      How is the patient currently taking it? (ex. 1XDay):  as directed  Is this a 30 day or 90 day RX:  as directed    Preferred Pharmacy with phone number:    [facility] [place], [place] - [address]  [address]  [facility] [address]  Phone: [phone] Fax: [phone]      Local or Mail Order:  local  Ordering Provider:  Tiffanie Kim Call Back Number:  [phone] or [phone]    Additional Information:  Please call back

## 2023-03-18 ENCOUNTER — TELEPHONE (OUTPATIENT)
Dept: FAMILY MEDICINE | Facility: CLINIC | Age: 58
End: 2023-03-18
Payer: MEDICAID

## 2023-03-18 NOTE — TELEPHONE ENCOUNTER
Please call patient and ask him to schedule a follow-up appointment with me for reassessment of both his hypertension and his anxiety and as to how the medications are doing as well.  He has some labs which still need to be obtained after an overnight fast of at least 8 hours if he can these beforehand we can go over the results as well.  Hope this note finds him doing well.  Remind him he should be seen at least every 6 months in the office for hypertension and anxiety as well as to continue to write for his medications

## 2023-04-19 DIAGNOSIS — F41.9 ANXIETY: ICD-10-CM

## 2023-04-19 RX ORDER — ESCITALOPRAM OXALATE 5 MG/1
TABLET ORAL
Qty: 90 TABLET | Refills: 1 | Status: SHIPPED | OUTPATIENT
Start: 2023-04-19 | End: 2023-07-30

## 2023-04-19 RX ORDER — ESCITALOPRAM OXALATE 10 MG/1
TABLET ORAL
Qty: 90 TABLET | Refills: 1 | Status: SHIPPED | OUTPATIENT
Start: 2023-04-19 | End: 2023-08-10 | Stop reason: SDUPTHER

## 2023-04-19 NOTE — TELEPHONE ENCOUNTER
Refill Decision Note   Jcarlos Rebolledo  is requesting a refill authorization.  Brief Assessment and Rationale for Refill:  Approve     Medication Therapy Plan:         Comments:     Note composed:3:54 PM 04/19/2023

## 2023-04-19 NOTE — TELEPHONE ENCOUNTER
No new care gaps identified.  [facility] Embedded Care Gaps. Reference number: [id number]. 4/19/2023   3:27:24 PM CDT

## 2023-06-14 ENCOUNTER — TELEPHONE (OUTPATIENT)
Dept: FAMILY MEDICINE | Facility: CLINIC | Age: 58
End: 2023-06-14
Payer: MEDICAID

## 2023-06-14 NOTE — TELEPHONE ENCOUNTER
----- Message from Ace Encinas sent at 6/14/2023  4:04 PM CDT -----  Regarding: appt  Contact: BRAULIO SCOTT [[id number]]  Type:  Sooner Appointment Request    Caller is requesting a sooner appointment.  Caller declined first available appointment listed below.  Caller will not accept being placed on the waitlist and is requesting a message be sent to doctor.    Name of Caller:  Jessica, wife    When is the first available appointment?  None through [facility]    Symptoms:  f/u med refill    Best Call Back Number:  [phone]    Additional Information:  Please call to advise.

## 2023-06-21 ENCOUNTER — TELEPHONE (OUTPATIENT)
Dept: FAMILY MEDICINE | Facility: CLINIC | Age: 58
End: 2023-06-21
Payer: MEDICAID

## 2023-06-21 NOTE — TELEPHONE ENCOUNTER
----- Message from Bridget Broderick sent at 6/21/2023  3:31 PM CDT -----  Regarding: Needs appt and refills  Please call to make appt and discuss refills.

## 2023-06-23 ENCOUNTER — TELEPHONE (OUTPATIENT)
Dept: FAMILY MEDICINE | Facility: CLINIC | Age: 58
End: 2023-06-23
Payer: MEDICAID

## 2023-06-23 DIAGNOSIS — G47.9 SLEEP DISORDER: ICD-10-CM

## 2023-06-23 DIAGNOSIS — F41.1 GENERALIZED ANXIETY DISORDER: ICD-10-CM

## 2023-06-23 DIAGNOSIS — F41.8 SITUATIONAL ANXIETY: ICD-10-CM

## 2023-06-23 NOTE — TELEPHONE ENCOUNTER
Care Due:                  Date            Visit Type   Department     Provider  --------------------------------------------------------------------------------                                EP -                              PRIMARY      [facility]  Last Visit: 08-      CARE ([facility])   MEDICINE       Murtaza Moreno                              EP -                              PRIMARY      [facility]  Next Visit: 07-      CARE ([facility])   MEDICINE       Evie Salcedo                                                            Last  Test          Frequency    Reason                     Performed    Due Date  --------------------------------------------------------------------------------    CMP.........  12 months..  valsartan................  07- 07-    Health [facility] Embedded Care Due Messages. Reference number: [id number].   6/23/2023 7:51:01 AM JENNY

## 2023-06-25 RX ORDER — TRAZODONE HYDROCHLORIDE 50 MG/1
75 TABLET ORAL NIGHTLY PRN
Qty: 135 TABLET | Refills: 0 | Status: SHIPPED | OUTPATIENT
Start: 2023-06-25 | End: 2023-08-10 | Stop reason: SDUPTHER

## 2023-06-30 DIAGNOSIS — I10 ESSENTIAL HYPERTENSION: ICD-10-CM

## 2023-06-30 NOTE — TELEPHONE ENCOUNTER
No care due was identified.  [facility] Embedded Care Due Messages. Reference number: [id number].   6/30/2023 2:28:50 PM CDT

## 2023-06-30 NOTE — TELEPHONE ENCOUNTER
----- Message from Nancy Esther sent at 6/30/2023 12:04 PM CDT -----  Contact: pt/wife savannah  Type:  RX Refill Request    Who Called:  pt/wife savannah  Refill or New Rx: valsartan (DIOVAN) 320 MG tablet      RX Name and Strength:  320mg  How is the patient currently taking it? (ex. 1XDay):  as directed  Is this a 30 day or 90 day RX:  90  Preferred Pharmacy with phone number:    [facility] [place], [place] - [address]  [address] [address]  Phone: [phone] Fax: [phone]     Local or Mail Order:  local  Ordering Provider:  Murtaza Moreno MD  Best Call Back Number:  [phone] (home)    Additional Information:

## 2023-07-03 DIAGNOSIS — I10 ESSENTIAL HYPERTENSION: ICD-10-CM

## 2023-07-04 RX ORDER — VALSARTAN 320 MG/1
320 TABLET ORAL DAILY
Qty: 90 TABLET | Refills: 0 | Status: SHIPPED | OUTPATIENT
Start: 2023-07-04 | End: 2023-07-06 | Stop reason: SDUPTHER

## 2023-07-04 RX ORDER — VALSARTAN 320 MG/1
320 TABLET ORAL DAILY
Qty: 90 TABLET | Refills: 0 | OUTPATIENT
Start: 2023-07-04 | End: 2024-07-03

## 2023-07-04 NOTE — TELEPHONE ENCOUNTER
No care due was identified.  [facility] Embedded Care Due Messages. Reference number: [id number].   7/04/2023 5:17:34 AM CDT

## 2023-07-04 NOTE — TELEPHONE ENCOUNTER
Refill Decision Note   Jcarlos Amaury  is requesting a refill authorization.  Brief Assessment and Rationale for Refill:  Approve     Medication Therapy Plan:         Comments:     Note composed:8:48 AM 07/04/2023             Appointments     Last Visit   8/25/2022 Murtaza Moreno MD   Next Visit   7/3/2023 Murtaza Moreno MD

## 2023-07-04 NOTE — TELEPHONE ENCOUNTER
Refill Decision Note   Jcarlos Rebolledo  is requesting a refill authorization.  Brief Assessment and Rationale for Refill:  Quick Discontinue     Medication Therapy Plan:  E-Prescribing Status: Receipt confirmed by [facility] 7/4/2023      Comments:     Note composed:8:49 AM 07/04/2023             Appointments     Last Visit   8/25/2022 Murtaza Moreno MD   Next Visit   Visit date not found Murtaza Moreno MD

## 2023-07-06 RX ORDER — VALSARTAN 320 MG/1
320 TABLET ORAL DAILY
Qty: 90 TABLET | Refills: 0 | Status: SHIPPED | OUTPATIENT
Start: 2023-07-06 | End: 2023-08-10 | Stop reason: SDUPTHER

## 2023-07-06 NOTE — TELEPHONE ENCOUNTER
Please call patient and tell him I sent in a 90 day supply of valsartan.  Unable to give him any refills.  This should enable him time to be seen by 1 of the other providers here in the office for reassessment.  He is past due for follow-up for reassessment for his hypertension, hyperlipidemia, and anxiety.  Last seen by me 08/25/2022.  Please have him obtain his labs from [facility] ordering on 08/25/2022 after an overnight fast before his follow-up visit

## 2023-08-10 ENCOUNTER — OFFICE VISIT (OUTPATIENT)
Dept: FAMILY MEDICINE | Facility: CLINIC | Age: 58
End: 2023-08-10
Payer: MEDICAID

## 2023-08-10 VITALS
HEART RATE: 69 BPM | DIASTOLIC BLOOD PRESSURE: 88 MMHG | WEIGHT: 223.19 LBS | RESPIRATION RATE: 18 BRPM | SYSTOLIC BLOOD PRESSURE: 134 MMHG | OXYGEN SATURATION: 97 % | BODY MASS INDEX: 31.25 KG/M2 | HEIGHT: 71 IN

## 2023-08-10 DIAGNOSIS — F41.1 GENERALIZED ANXIETY DISORDER: ICD-10-CM

## 2023-08-10 DIAGNOSIS — Z13.220 ENCOUNTER FOR SCREENING FOR LIPID DISORDER: ICD-10-CM

## 2023-08-10 DIAGNOSIS — F41.1 GENERALIZED ANXIETY DISORDER: Primary | ICD-10-CM

## 2023-08-10 DIAGNOSIS — I10 ESSENTIAL HYPERTENSION: ICD-10-CM

## 2023-08-10 DIAGNOSIS — F51.01 PRIMARY INSOMNIA: Chronic | ICD-10-CM

## 2023-08-10 DIAGNOSIS — Z13.1 ENCOUNTER FOR SCREENING FOR DIABETES MELLITUS: ICD-10-CM

## 2023-08-10 PROCEDURE — 4010F PR ACE/ARB THEARPY RXD/TAKEN: ICD-10-PCS | Mod: CPTII,,, | Performed by: STUDENT IN AN ORGANIZED HEALTH CARE EDUCATION/TRAINING PROGRAM

## 2023-08-10 PROCEDURE — 99999 PR PBB SHADOW E&M-EST. PATIENT-LVL III: CPT | Mod: PBBFAC,,, | Performed by: STUDENT IN AN ORGANIZED HEALTH CARE EDUCATION/TRAINING PROGRAM

## 2023-08-10 PROCEDURE — 3075F SYST BP GE 130 - 139MM HG: CPT | Mod: CPTII,,, | Performed by: STUDENT IN AN ORGANIZED HEALTH CARE EDUCATION/TRAINING PROGRAM

## 2023-08-10 PROCEDURE — 1160F RVW MEDS BY RX/DR IN RCRD: CPT | Mod: CPTII,,, | Performed by: STUDENT IN AN ORGANIZED HEALTH CARE EDUCATION/TRAINING PROGRAM

## 2023-08-10 PROCEDURE — 3008F PR BODY MASS INDEX (BMI) DOCUMENTED: ICD-10-PCS | Mod: CPTII,,, | Performed by: STUDENT IN AN ORGANIZED HEALTH CARE EDUCATION/TRAINING PROGRAM

## 2023-08-10 PROCEDURE — 3008F BODY MASS INDEX DOCD: CPT | Mod: CPTII,,, | Performed by: STUDENT IN AN ORGANIZED HEALTH CARE EDUCATION/TRAINING PROGRAM

## 2023-08-10 PROCEDURE — 1160F PR REVIEW ALL MEDS BY PRESCRIBER/CLIN PHARMACIST DOCUMENTED: ICD-10-PCS | Mod: CPTII,,, | Performed by: STUDENT IN AN ORGANIZED HEALTH CARE EDUCATION/TRAINING PROGRAM

## 2023-08-10 PROCEDURE — 3075F PR MOST RECENT SYSTOLIC BLOOD PRESS GE 130-139MM HG: ICD-10-PCS | Mod: CPTII,,, | Performed by: STUDENT IN AN ORGANIZED HEALTH CARE EDUCATION/TRAINING PROGRAM

## 2023-08-10 PROCEDURE — 3079F PR MOST RECENT DIASTOLIC BLOOD PRESSURE 80-89 MM HG: ICD-10-PCS | Mod: CPTII,,, | Performed by: STUDENT IN AN ORGANIZED HEALTH CARE EDUCATION/TRAINING PROGRAM

## 2023-08-10 PROCEDURE — 99999 PR PBB SHADOW E&M-EST. PATIENT-LVL III: ICD-10-PCS | Mod: PBBFAC,,, | Performed by: STUDENT IN AN ORGANIZED HEALTH CARE EDUCATION/TRAINING PROGRAM

## 2023-08-10 PROCEDURE — 1159F PR MEDICATION LIST DOCUMENTED IN MEDICAL RECORD: ICD-10-PCS | Mod: CPTII,,, | Performed by: STUDENT IN AN ORGANIZED HEALTH CARE EDUCATION/TRAINING PROGRAM

## 2023-08-10 PROCEDURE — 3079F DIAST BP 80-89 MM HG: CPT | Mod: CPTII,,, | Performed by: STUDENT IN AN ORGANIZED HEALTH CARE EDUCATION/TRAINING PROGRAM

## 2023-08-10 PROCEDURE — 1159F MED LIST DOCD IN RCRD: CPT | Mod: CPTII,,, | Performed by: STUDENT IN AN ORGANIZED HEALTH CARE EDUCATION/TRAINING PROGRAM

## 2023-08-10 PROCEDURE — 99214 PR OFFICE/OUTPT VISIT, EST, LEVL IV, 30-39 MIN: ICD-10-PCS | Mod: S$PBB,,, | Performed by: STUDENT IN AN ORGANIZED HEALTH CARE EDUCATION/TRAINING PROGRAM

## 2023-08-10 PROCEDURE — 4010F ACE/ARB THERAPY RXD/TAKEN: CPT | Mod: CPTII,,, | Performed by: STUDENT IN AN ORGANIZED HEALTH CARE EDUCATION/TRAINING PROGRAM

## 2023-08-10 PROCEDURE — 99214 OFFICE O/P EST MOD 30 MIN: CPT | Mod: S$PBB,,, | Performed by: STUDENT IN AN ORGANIZED HEALTH CARE EDUCATION/TRAINING PROGRAM

## 2023-08-10 PROCEDURE — 99213 OFFICE O/P EST LOW 20 MIN: CPT | Mod: PBBFAC,PN | Performed by: STUDENT IN AN ORGANIZED HEALTH CARE EDUCATION/TRAINING PROGRAM

## 2023-08-10 RX ORDER — ESCITALOPRAM OXALATE 10 MG/1
TABLET ORAL
Qty: 90 TABLET | Refills: 3 | Status: SHIPPED | OUTPATIENT
Start: 2023-08-10

## 2023-08-10 RX ORDER — ESCITALOPRAM OXALATE 10 MG/1
TABLET ORAL
Qty: 31 TABLET | Refills: 1 | OUTPATIENT
Start: 2023-08-10

## 2023-08-10 RX ORDER — ESCITALOPRAM OXALATE 5 MG/1
TABLET ORAL
Qty: 90 TABLET | Refills: 3 | Status: SHIPPED | OUTPATIENT
Start: 2023-08-10

## 2023-08-10 RX ORDER — TRAZODONE HYDROCHLORIDE 50 MG/1
75 TABLET ORAL NIGHTLY PRN
Qty: 135 TABLET | Refills: 3 | Status: SHIPPED | OUTPATIENT
Start: 2023-08-10 | End: 2024-08-04

## 2023-08-10 RX ORDER — VALSARTAN 320 MG/1
320 TABLET ORAL DAILY
Qty: 90 TABLET | Refills: 3 | Status: SHIPPED | OUTPATIENT
Start: 2023-08-10 | End: 2024-08-04

## 2023-08-10 NOTE — PROGRESS NOTES
Plan:     Jcarlos was seen today for health maintenance.    Diagnoses and all orders for this visit:    Generalized anxiety disorder  -     EScitalopram oxalate (LEXAPRO) 10 MG tablet; TAKE 1 TABLET BY MOUTH ONCE DAILY WITH 5 MG TABLET (TOTAL 15MG PER DAY)  -     EScitalopram oxalate (LEXAPRO) 5 MG Tab; TAKE 1 TABLET BY MOUTH ONCE EVERY DAY WITH 10 MG TABLET (TOTAL 15 MG A DAY )  -     traZODone (DESYREL) 50 MG tablet; Take 1.5 tablets (75 mg total) by mouth nightly as needed for Insomnia.    Essential hypertension  -     valsartan (DIOVAN) 320 MG tablet; Take 1 tablet (320 mg total) by mouth once daily.    Primary insomnia  -     traZODone (DESYREL) 50 MG tablet; Take 1.5 tablets (75 mg total) by mouth nightly as needed for Insomnia.    Encounter for screening for lipid disorder  -     Lipid Panel; Future    Encounter for screening for diabetes mellitus  -     Hemoglobin A1C; Future  -     Comprehensive Metabolic Panel; Future       Follow up in about 6 months (around 2/10/2024), or if symptoms worsen or fail to improve.    Evie Salcedo MD  08/10/2023    Subjective:      Patient ID: Jcarlos Rebolledo is a 57 y.o. male    Chief Complaint   Patient presents with    Health Maintenance     Refill; est care from [facility]  57 y.o. male with a PMHx as documented below presents to clinic today for the following:    Tdap within the last 10 years.     HTN:   - Valsartan 320 mg daily     DAWIT; insomnia:   - Lexapro 15 mg daily  - Trazodone 75 mg qhs PRN    ROS  Constitutional:  Negative for chills and fever.   Respiratory:  Negative for shortness of breath.    Cardiovascular:  Negative for chest pain.   Gastrointestinal:  Negative for abdominal pain, constipation, diarrhea, nausea and vomiting.     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 No pertinent surgical history.    Review of patient's allergies indicates:  No Known Allergies    Family History   Problem Relation Age of Onset    Thyroid disease Mother     Hypertension Mother     Leukemia Father      Social History     Tobacco Use    Smoking status: Never    Smokeless tobacco: Never   Substance Use Topics    Alcohol use: Yes     Alcohol/week: 7.0 standard drinks of alcohol     Types: 2 Cans of beer, 5 Standard drinks or equivalent per week     Currently on File with [facility]:   Most Recent Immunizations   Administered Date(s) Administered    COVID-19, MRNA, LN-S, PF (MODERNA FULL 0.5 ML DOSE) 09/03/2021    Influenza - Quadrivalent - PF *Preferred* (6 months and older) 09/21/2020    Tdap 10/18/2009     Objective:      Vitals:    08/10/23 1548   BP: 134/88   BP Location: Right arm   Patient Position: Sitting   Pulse: 69   Resp: 18   SpO2: 97%   Weight: 101.2 kg (223 lb 3.5 oz)   Height: 5' 11" (1.803 m)     Body mass index is 31.13 kg/m².    Physical Exam   Constitutional:       General: No acute distress.  HENT:      Head: Normocephalic and atraumatic.   Pulmonary:      Effort: Pulmonary effort is normal. No respiratory distress.   Neurological:      General: No focal deficit present.      Mental Status: Alert and oriented to person, place, and time. Mental status is at baseline.    Assessment:       1. Generalized anxiety disorder    2. Essential hypertension    3. Primary insomnia    4. Encounter for screening for lipid disorder    5. Encounter for screening for diabetes mellitus  "       Evie Salcedo MD  [facility]  Office: [phone]   Fax: [phone]  08/10/2023      Disclaimer: This note was partly generated using dictation software which may occasionally result in transcription errors.    Total time spent on this encounter includes face to face time and non-face to face time preparing to see the patient (eg, review of tests), obtaining and/or reviewing separately obtained history, documenting clinical information in the electronic or other health record, independently interpreting results, and communicating results to the patient/family/caregiver, or care coordinator.     no

## 2023-08-10 NOTE — TELEPHONE ENCOUNTER
Refill Decision Note   Jcarlos Amaury  is requesting a refill authorization.  Brief Assessment and Rationale for Refill:  Quick Discontinue     Medication Therapy Plan:  Receipt confirmed by pharmacy (8/10/2023  3:59 PM CDT)      Comments:     Note composed:6:02 PM 08/10/2023

## 2023-08-10 NOTE — TELEPHONE ENCOUNTER
No care due was identified.  Health [facility] Embedded Care Due Messages. Reference number: [id number].   8/10/2023 4:04:25 PM CDT

## 2024-06-25 ENCOUNTER — TELEPHONE (OUTPATIENT)
Dept: FAMILY MEDICINE | Facility: CLINIC | Age: 59
End: 2024-06-25
Payer: COMMERCIAL

## 2024-06-25 NOTE — TELEPHONE ENCOUNTER
----- Message from Phylicia King sent at 6/24/2024 11:46 AM CDT -----  Regarding: Needs same day appt  Type:  Same Day Appointment Request    Caller is requesting a same day appointment.  Caller declined first available appointment listed below.      Name of Caller:  Pt  When is the first available appointment?  Wednesday  Symptoms:  pt has not been feeling well at all, thinks its bp related  Best Call Back Number:       Additional Information:   Please call

## 2024-06-25 NOTE — TELEPHONE ENCOUNTER
Left message to call clinic . Pt already has an appt sched to see Daisy LUO dawn afternoon. Advised in message , if pt needs sooner, pls call us back .--lp

## 2024-06-26 ENCOUNTER — OFFICE VISIT (OUTPATIENT)
Dept: FAMILY MEDICINE | Facility: CLINIC | Age: 59
End: 2024-06-26
Payer: COMMERCIAL

## 2024-06-26 VITALS
HEART RATE: 58 BPM | OXYGEN SATURATION: 98 % | HEIGHT: 71 IN | WEIGHT: 217.38 LBS | BODY MASS INDEX: 30.43 KG/M2 | DIASTOLIC BLOOD PRESSURE: 80 MMHG | SYSTOLIC BLOOD PRESSURE: 128 MMHG

## 2024-06-26 DIAGNOSIS — F41.1 GENERALIZED ANXIETY DISORDER: Primary | ICD-10-CM

## 2024-06-26 DIAGNOSIS — Z13.1 SCREENING FOR DIABETES MELLITUS: ICD-10-CM

## 2024-06-26 DIAGNOSIS — I10 ESSENTIAL HYPERTENSION: ICD-10-CM

## 2024-06-26 DIAGNOSIS — E78.49 OTHER HYPERLIPIDEMIA: ICD-10-CM

## 2024-06-26 PROCEDURE — 99999 PR PBB SHADOW E&M-EST. PATIENT-LVL IV: CPT | Mod: PBBFAC,,, | Performed by: NURSE PRACTITIONER

## 2024-06-26 PROCEDURE — 3074F SYST BP LT 130 MM HG: CPT | Mod: CPTII,S$GLB,, | Performed by: NURSE PRACTITIONER

## 2024-06-26 PROCEDURE — 1160F RVW MEDS BY RX/DR IN RCRD: CPT | Mod: CPTII,S$GLB,, | Performed by: NURSE PRACTITIONER

## 2024-06-26 PROCEDURE — 99214 OFFICE O/P EST MOD 30 MIN: CPT | Mod: S$GLB,,, | Performed by: NURSE PRACTITIONER

## 2024-06-26 PROCEDURE — 3008F BODY MASS INDEX DOCD: CPT | Mod: CPTII,S$GLB,, | Performed by: NURSE PRACTITIONER

## 2024-06-26 PROCEDURE — 1159F MED LIST DOCD IN RCRD: CPT | Mod: CPTII,S$GLB,, | Performed by: NURSE PRACTITIONER

## 2024-06-26 PROCEDURE — 4010F ACE/ARB THERAPY RXD/TAKEN: CPT | Mod: CPTII,S$GLB,, | Performed by: NURSE PRACTITIONER

## 2024-06-26 PROCEDURE — 3079F DIAST BP 80-89 MM HG: CPT | Mod: CPTII,S$GLB,, | Performed by: NURSE PRACTITIONER

## 2024-06-26 RX ORDER — ESCITALOPRAM OXALATE 20 MG/1
20 TABLET ORAL DAILY
Qty: 90 TABLET | Refills: 1 | Status: SHIPPED | OUTPATIENT
Start: 2024-06-26 | End: 2024-12-23

## 2024-06-26 RX ORDER — VALSARTAN 320 MG/1
320 TABLET ORAL DAILY
Qty: 90 TABLET | Refills: 3 | Status: SHIPPED | OUTPATIENT
Start: 2024-06-26 | End: 2025-06-21

## 2024-06-26 NOTE — PROGRESS NOTES
THIS DOCUMENT WAS MADE IN PART WITH VOICE RECOGNITION SOFTWARE.  OCCASIONALLY THIS SOFTWARE WILL MISINTERPRET WORDS OR PHRASES.     Assessment and Plan:    Generalized anxiety disorder  Comments:  Lexapro increased  Orders:  -     CBC Without Differential; Future; Expected date: 06/26/2024  -     Comprehensive Metabolic Panel; Future; Expected date: 06/26/2024  -     TSH; Future; Expected date: 06/26/2024  -     EScitalopram oxalate (LEXAPRO) 20 MG tablet; Take 1 tablet (20 mg total) by mouth once daily.  Dispense: 90 tablet; Refill: 1    Essential hypertension  Comments:  controlled   continue Diovan   limit sodium intake   monitor BP  Orders:  -     CBC Without Differential; Future; Expected date: 06/26/2024  -     Comprehensive Metabolic Panel; Future; Expected date: 06/26/2024  -     Lipid Panel; Future; Expected date: 06/26/2024  -     TSH; Future; Expected date: 06/26/2024  -     valsartan (DIOVAN) 320 MG tablet; Take 1 tablet (320 mg total) by mouth once daily.  Dispense: 90 tablet; Refill: 3    Other hyperlipidemia  Comments:  work on diet  Orders:  -     Lipid Panel; Future; Expected date: 06/26/2024    Screening for diabetes mellitus  -     Hemoglobin A1C; Future; Expected date: 06/26/2024             Visit summary:    Will  check labs.  Discussed increasing Lexapro to help with anxiety,  patient is in agreement.  Rx Lexapro 20 mg daily.  Patient advised on medication and symptomatic treatment.  Advised on stress  management.   Emergent conditions discussed.    Follow up in about 6 months (around 12/26/2024) for Follow-up with PCP- Dr. Salcedo.   ______________________________________________________________________  Subjective:    Chief Complaint:   Feeling flushed-     HPI:  Jcarlos is a 58 y.o. 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 Patient states that  he under a lot of stress at work.  Not only is it hot,  but he  is doing more work.  Patient reports he worries a lot,  always thinking.  He is concerned that anxiety maybe contributing to symptoms.  States that he is always thinking- worrying.  He reports that he tries to stay well hydrated while at work. He reports that  he has been monitoring blood pressure and it has been normal.       Hypertension:  Controlled, valsartan 320 mg daily  Anxiety:  Lexapro 15 mg nightly  Insomnia:   controlled,  takes trazodone 75 mg as needed         Medications:  Current Outpatient Medications on File Prior to Visit   Medication Sig Dispense Refill    aspirin (ECOTRIN) 81 MG EC tablet Take 81 mg by mouth once daily.      traZODone (DESYREL) 50 MG tablet Take 1.5 tablets (75 mg total) by mouth nightly as needed for Insomnia. 135 tablet 3    [DISCONTINUED] EScitalopram oxalate (LEXAPRO) 10 MG tablet TAKE 1 TABLET BY MOUTH ONCE DAILY WITH 5 MG TABLET (TOTAL 15MG PER DAY) 90 tablet 3    [DISCONTINUED] EScitalopram oxalate (LEXAPRO) 5 MG Tab TAKE 1 TABLET BY MOUTH ONCE EVERY DAY WITH 10 MG TABLET (TOTAL 15 MG A DAY ) 90 tablet 3    [DISCONTINUED] valsartan (DIOVAN) 320 MG tablet Take 1 tablet (320 mg total) by mouth once daily. 90 tablet 3     No current facility-administered medications on file prior to visit.       Review of Systems:  Review of Systems   Constitutional:  Negative for fatigue and fever.   Respiratory:  Negative for cough, chest tightness, shortness of breath and wheezing.    Cardiovascular:  Negative for chest pain, palpitations and leg swelling.   Musculoskeletal:  Positive for neck stiffness. Negative for neck pain.   Neurological:  Negative for light-headedness and numbness.   Psychiatric/Behavioral:  Positive for decreased concentration. Negative for confusion and sleep disturbance. The patient is nervous/anxious.        Past Medical History:  Past Medical History:   Diagnosis Date    Essential hypertension  "05/14/2021    DAWIT (generalized anxiety disorder) 02/24/2012    [place] syndrome 04/04/2016    Primary insomnia        Objective:    Vitals:  Vitals:    06/26/24 1319   BP: 128/80   Pulse: (!) 58   SpO2: 98%   Weight: 98.6 kg (217 lb 6 oz)   Height: 5' 11" (1.803 m)   PainSc: 0-No pain       Physical Exam  Vitals and nursing note reviewed.   Constitutional:       General: He is not in acute distress.  HENT:      Head: Normocephalic and atraumatic.      Nose: Nose normal.      Mouth/Throat:      Mouth: Mucous membranes are moist.      Pharynx: Oropharynx is clear.   Eyes:      General: No scleral icterus.     Conjunctiva/sclera: Conjunctivae normal.   Neck:      Vascular: No carotid bruit.   Cardiovascular:      Rate and Rhythm: Normal rate and regular rhythm.   Pulmonary:      Effort: Pulmonary effort is normal. No respiratory distress.      Breath sounds: Normal breath sounds.   Abdominal:      Palpations: Abdomen is soft.   Musculoskeletal:      Cervical back: Neck supple. No rigidity.      Right lower leg: No edema.      Left lower leg: No edema.   Lymphadenopathy:      Cervical: No cervical adenopathy.   Skin:     General: Skin is warm and dry.   Neurological:      Mental Status: He is alert and oriented to person, place, and time.   Psychiatric:         Mood and Affect: Mood normal.         Behavior: Behavior normal.         Thought Content: Thought content normal.         Data:  The 10-year ASCVD risk score (Leonides HIGGINBOTHAM, et al., 2019) is: 5.9%    Values used to calculate the score:      Age: 58 years      Sex: Male      Is Non- : No      Diabetic: No      Tobacco smoker: No      Systolic Blood Pressure: 128 mmHg      Is BP treated: Yes      HDL Cholesterol: 52 mg/dL      Total Cholesterol: 144 mg/dL .      Medical history reviewed, Medications reconciled.          ARTEMIO Prajapati  Family Medicine      "

## 2024-07-08 DIAGNOSIS — I10 ESSENTIAL HYPERTENSION: ICD-10-CM

## 2024-07-08 NOTE — TELEPHONE ENCOUNTER
No care due was identified.  [facility] Embedded Care Due Messages. Reference number: [id number].   7/08/2024 9:38:05 AM CDT

## 2024-07-09 RX ORDER — VALSARTAN 320 MG/1
320 TABLET ORAL DAILY
Qty: 90 TABLET | Refills: 3 | OUTPATIENT
Start: 2024-07-09 | End: 2025-07-04

## 2024-07-09 NOTE — TELEPHONE ENCOUNTER
Refill Decision Note   Jcarlos Rebolledo  is requesting a refill authorization.  Brief Assessment and Rationale for Refill:  Quick Discontinue     Medication Therapy Plan:  Receipt confirmed by pharmacy (6/26/2024  1:59 PM CDT)      Comments:     Note composed:2:40 AM 07/09/2024

## 2024-08-26 ENCOUNTER — TELEPHONE (OUTPATIENT)
Dept: FAMILY MEDICINE | Facility: CLINIC | Age: 59
End: 2024-08-26
Payer: COMMERCIAL

## 2024-08-26 ENCOUNTER — LAB VISIT (OUTPATIENT)
Dept: LAB | Facility: HOSPITAL | Age: 59
End: 2024-08-26
Attending: NURSE PRACTITIONER
Payer: COMMERCIAL

## 2024-08-26 DIAGNOSIS — F41.1 GENERALIZED ANXIETY DISORDER: ICD-10-CM

## 2024-08-26 DIAGNOSIS — I10 ESSENTIAL HYPERTENSION: ICD-10-CM

## 2024-08-26 DIAGNOSIS — Z13.1 SCREENING FOR DIABETES MELLITUS: ICD-10-CM

## 2024-08-26 DIAGNOSIS — E78.49 OTHER HYPERLIPIDEMIA: ICD-10-CM

## 2024-08-26 DIAGNOSIS — F51.01 PRIMARY INSOMNIA: Chronic | ICD-10-CM

## 2024-08-26 LAB
ALBUMIN SERPL BCP-MCNC: 3.9 G/DL (ref 3.5–5.2)
ALP SERPL-CCNC: 62 U/L (ref 55–135)
ALT SERPL W/O P-5'-P-CCNC: 15 U/L (ref 10–44)
ANION GAP SERPL CALC-SCNC: 12 MMOL/L (ref 8–16)
AST SERPL-CCNC: 14 U/L (ref 10–40)
BILIRUB SERPL-MCNC: 0.9 MG/DL (ref 0.1–1)
BUN SERPL-MCNC: 22 MG/DL (ref 6–20)
CALCIUM SERPL-MCNC: 9.4 MG/DL (ref 8.7–10.5)
CHLORIDE SERPL-SCNC: 106 MMOL/L (ref 95–110)
CHOLEST SERPL-MCNC: 147 MG/DL (ref 120–199)
CHOLEST/HDLC SERPL: 3.1 {RATIO} (ref 2–5)
CO2 SERPL-SCNC: 21 MMOL/L (ref 23–29)
CREAT SERPL-MCNC: 1 MG/DL (ref 0.5–1.4)
ERYTHROCYTE [DISTWIDTH] IN BLOOD BY AUTOMATED COUNT: 13.7 % (ref 11.5–14.5)
EST. GFR  (NO RACE VARIABLE): >60 ML/MIN/1.73 M^2
ESTIMATED AVG GLUCOSE: 105 MG/DL (ref 68–131)
GLUCOSE SERPL-MCNC: 107 MG/DL (ref 70–110)
HBA1C MFR BLD: 5.3 % (ref 4–5.6)
HCT VFR BLD AUTO: 49.5 % (ref 40–54)
HDLC SERPL-MCNC: 47 MG/DL (ref 40–75)
HDLC SERPL: 32 % (ref 20–50)
HGB BLD-MCNC: 16.1 G/DL (ref 14–18)
LDLC SERPL CALC-MCNC: 89.4 MG/DL (ref 63–159)
MCH RBC QN AUTO: 29.5 PG (ref 27–31)
MCHC RBC AUTO-ENTMCNC: 32.5 G/DL (ref 32–36)
MCV RBC AUTO: 91 FL (ref 82–98)
NONHDLC SERPL-MCNC: 100 MG/DL
PLATELET # BLD AUTO: 241 K/UL (ref 150–450)
PMV BLD AUTO: 11.6 FL (ref 9.2–12.9)
POTASSIUM SERPL-SCNC: 4.4 MMOL/L (ref 3.5–5.1)
PROT SERPL-MCNC: 6.5 G/DL (ref 6–8.4)
RBC # BLD AUTO: 5.46 M/UL (ref 4.6–6.2)
SODIUM SERPL-SCNC: 139 MMOL/L (ref 136–145)
TRIGL SERPL-MCNC: 53 MG/DL (ref 30–150)
TSH SERPL DL<=0.005 MIU/L-ACNC: 1.99 UIU/ML (ref 0.4–4)
WBC # BLD AUTO: 5.04 K/UL (ref 3.9–12.7)

## 2024-08-26 PROCEDURE — 80053 COMPREHEN METABOLIC PANEL: CPT | Performed by: NURSE PRACTITIONER

## 2024-08-26 PROCEDURE — 83036 HEMOGLOBIN GLYCOSYLATED A1C: CPT | Performed by: NURSE PRACTITIONER

## 2024-08-26 PROCEDURE — 80061 LIPID PANEL: CPT | Performed by: NURSE PRACTITIONER

## 2024-08-26 PROCEDURE — 85027 COMPLETE CBC AUTOMATED: CPT | Performed by: NURSE PRACTITIONER

## 2024-08-26 PROCEDURE — 84443 ASSAY THYROID STIM HORMONE: CPT | Performed by: NURSE PRACTITIONER

## 2024-08-26 RX ORDER — TRAZODONE HYDROCHLORIDE 50 MG/1
75 TABLET ORAL NIGHTLY PRN
Qty: 135 TABLET | Refills: 0 | Status: SHIPPED | OUTPATIENT
Start: 2024-08-26

## 2024-08-26 RX ORDER — ESCITALOPRAM OXALATE 5 MG/1
TABLET ORAL
Qty: 30 TABLET | Refills: 3 | OUTPATIENT
Start: 2024-08-26

## 2024-08-26 RX ORDER — ESCITALOPRAM OXALATE 10 MG/1
TABLET ORAL
Qty: 30 TABLET | Refills: 3 | OUTPATIENT
Start: 2024-08-26

## 2024-08-26 NOTE — TELEPHONE ENCOUNTER
----- Message from Brenda Larry sent at 8/26/2024  3:25 PM CDT -----  Contact: Jessica Chopra  Type:  RX Refill Request    Who Called:   WifeJessica  Refill or New Rx:  Refill    RX Name and Strength:  EScitalopram oxalate (LEXAPRO) 20 MG tablet    Preferred Pharmacy with phone number:      [facility] [place], [place] - [address] [address]  Phone: [phone] Fax: [phone]    Local or Mail Order:  Local  Ordering Provider:  Daisy Berg    Would the patient rather a call back or a response via [facility]?   Call back  Best Call Back Number:  [phone]    Additional Information:   States he is completely out of Lexapro and requesting refills - please call - thank you

## 2024-08-26 NOTE — TELEPHONE ENCOUNTER
Please approve for EScitalopram oxalate (LEXAPRO) 20 MG      Last OV 06/26/2024  Last refill date 06/26/2024  Last labs 08/26/2024     Next appt ---

## 2024-08-26 NOTE — TELEPHONE ENCOUNTER
No care due was identified.  [facility] Embedded Care Due Messages. Reference number: [id number].   8/26/2024 3:55:16 PM CDT

## 2024-08-26 NOTE — TELEPHONE ENCOUNTER
Care Due:                  Date            Visit Type   Department     Provider  --------------------------------------------------------------------------------                                EP -                              PRIMARY      [facility] FAM[facility]  Last Visit: 08-      CARE ([facility])   MEDICINE       Evie Salcedo  Next Visit: None Scheduled  None         None Found                                                            Last  Test          Frequency    Reason                     Performed    Due Date  --------------------------------------------------------------------------------    Office Visit  15 months..  traZODone................  08-   11-    Health [facility] Embedded Care Due Messages. Reference number: [id number].   8/26/2024 10:40:55 AM CDT

## 2024-08-26 NOTE — TELEPHONE ENCOUNTER
----- Message from Brenda Larry sent at 8/26/2024  3:25 PM CDT -----  Contact: Jessica Chopra  Type:  RX Refill Request    Who Called:   WifeJessica  Refill or New Rx:  Refill    RX Name and Strength:  EScitalopram oxalate (LEXAPRO) 20 MG tablet    Preferred Pharmacy with phone number:      [facility] [place], [place] - [address] [facility] [address] [address]  Phone: [phone] Fax: [phone]    Local or Mail Order:  Local  Ordering Provider:  Daisy Berg    Would the patient rather a call back or a response via [facility]?   Call back  Best Call Back Number:  [phone]    Additional Information:   States he is completely out of Lexapro and requesting refills - please call - thank you

## 2024-08-27 RX ORDER — ESCITALOPRAM OXALATE 20 MG/1
20 TABLET ORAL DAILY
Qty: 90 TABLET | Refills: 1 | Status: SHIPPED | OUTPATIENT
Start: 2024-08-27 | End: 2025-02-23

## 2024-08-27 NOTE — TELEPHONE ENCOUNTER
Provider Staff:  Action required for this patient    Requires appointment      Please see care gap opportunities below in Care Due Message.    Thanks!  [facility]     Appointments      Date Provider   Last Visit   8/10/2023 Evie Salcedo MD   Next Visit   8/26/2024 Evie Salcedo MD     Refill Decision Note   Jcarlos Rebolledo  is requesting a refill authorization.  Brief Assessment and Rationale for Refill:  Approve  Route     Medication Therapy Plan:  pt is on 20 mg      Comments:     Note composed:7:23 PM 08/26/2024

## 2024-08-27 NOTE — TELEPHONE ENCOUNTER
Refill Routing Note   Medication(s) are not appropriate for processing by [facility] for the following reason(s):        Non-participating provider    [facility] action(s):  Route               Appointments  past 12m or future 3m with PCP    Date Provider   Last Visit   6/26/2024 Daisy Berg FNP   Next Visit   Visit date not found Daisy Berg FNP   ED visits in past 90 days: 0        Note composed:4:41 AM 08/27/2024

## 2024-12-03 DIAGNOSIS — F41.1 GENERALIZED ANXIETY DISORDER: ICD-10-CM

## 2024-12-03 DIAGNOSIS — F51.01 PRIMARY INSOMNIA: Chronic | ICD-10-CM

## 2024-12-03 RX ORDER — TRAZODONE HYDROCHLORIDE 50 MG/1
75 TABLET ORAL NIGHTLY PRN
Qty: 135 TABLET | Refills: 0 | Status: SHIPPED | OUTPATIENT
Start: 2024-12-03

## 2024-12-03 NOTE — TELEPHONE ENCOUNTER
No care due was identified.  [facility] Embedded Care Due Messages. Reference number: [id number].   12/03/2024 11:19:56 AM CST

## 2024-12-17 DIAGNOSIS — F41.1 GENERALIZED ANXIETY DISORDER: ICD-10-CM

## 2024-12-18 RX ORDER — ESCITALOPRAM OXALATE 20 MG/1
20 TABLET ORAL DAILY
Qty: 90 TABLET | Refills: 0 | Status: SHIPPED | OUTPATIENT
Start: 2024-12-18

## 2024-12-18 NOTE — TELEPHONE ENCOUNTER
Refill Routing Note   Medication(s) are not appropriate for processing by [facility] for the following reason(s):        Responsible provider unclear  Patient not seen by provider within 15 months  ED/Hospital Visit since last OV with provider: LOV 8/10/23 >12 months since LOV with PCP    [facility] action(s):  Defer      Medication Therapy Plan: Last ordered: 8/27/24 by TRINIDAD Prajapati. No current order by PCP      Appointments  past 12m or future 3m with PCP    Date Provider   Last Visit   8/10/2023 Evie Salcedo MD   Next Visit   Visit date not found Evie Salcedo MD   ED visits in past 90 days: 1        Note composed:9:10 AM 12/18/2024

## 2024-12-18 NOTE — TELEPHONE ENCOUNTER
One-time refill request approved - patient will need to schedule clinic appointment prior to additional medication refills.     Evie Salcedo MD  [facility]  Office: [phone]   Fax: [phone]  12/18/2024

## 2025-03-10 DIAGNOSIS — F41.1 GENERALIZED ANXIETY DISORDER: ICD-10-CM

## 2025-03-10 DIAGNOSIS — F51.01 PRIMARY INSOMNIA: Chronic | ICD-10-CM

## 2025-03-10 RX ORDER — TRAZODONE HYDROCHLORIDE 50 MG/1
TABLET ORAL
Qty: 135 TABLET | Refills: 0 | OUTPATIENT
Start: 2025-03-10

## 2025-03-10 NOTE — TELEPHONE ENCOUNTER
Care Due:                  Date            Visit Type   Department     Provider  --------------------------------------------------------------------------------                                EP -                              PRIMARY      [facility] FAMILY  Last Visit: 08-      CARE ([facility])   MEDICINE       Evie Salcedo  Next Visit: None Scheduled  None         None Found                                                            Last  Test          Frequency    Reason                     Performed    Due Date  --------------------------------------------------------------------------------    Office Visit  15 months..  EScitalopram, traZODone..  08-   11-    [facility] Embedded Care Due Messages. Reference number: [id number].   3/10/2025 10:21:46 AM CDT

## 2025-03-10 NOTE — TELEPHONE ENCOUNTER
Unable to refill medication as patient requires appointment prior to additional medication refills.    Evie Salcedo MD  [facility]  Office: [phone]   Fax: [phone]  03/10/2025

## 2025-03-17 ENCOUNTER — TELEPHONE (OUTPATIENT)
Dept: FAMILY MEDICINE | Facility: CLINIC | Age: 60
End: 2025-03-17
Payer: COMMERCIAL

## 2025-03-17 NOTE — TELEPHONE ENCOUNTER
See refill request message . Pt will need a VV or in person appt before we can refill Trazodone . Left message on pt voicemail to call clinic to sched appt .--lp

## 2025-04-01 ENCOUNTER — OFFICE VISIT (OUTPATIENT)
Dept: FAMILY MEDICINE | Facility: CLINIC | Age: 60
End: 2025-04-01
Payer: COMMERCIAL

## 2025-04-01 VITALS
DIASTOLIC BLOOD PRESSURE: 80 MMHG | HEART RATE: 61 BPM | HEIGHT: 71 IN | WEIGHT: 226.5 LBS | OXYGEN SATURATION: 97 % | BODY MASS INDEX: 31.71 KG/M2 | SYSTOLIC BLOOD PRESSURE: 132 MMHG

## 2025-04-01 DIAGNOSIS — F51.01 PRIMARY INSOMNIA: Chronic | ICD-10-CM

## 2025-04-01 DIAGNOSIS — E78.49 OTHER HYPERLIPIDEMIA: ICD-10-CM

## 2025-04-01 DIAGNOSIS — Z13.1 SCREENING FOR DIABETES MELLITUS: ICD-10-CM

## 2025-04-01 DIAGNOSIS — F41.1 GENERALIZED ANXIETY DISORDER: ICD-10-CM

## 2025-04-01 DIAGNOSIS — I10 ESSENTIAL HYPERTENSION: ICD-10-CM

## 2025-04-01 PROCEDURE — 99214 OFFICE O/P EST MOD 30 MIN: CPT | Mod: S$GLB,,, | Performed by: NURSE PRACTITIONER

## 2025-04-01 PROCEDURE — 99999 PR PBB SHADOW E&M-EST. PATIENT-LVL III: CPT | Mod: PBBFAC,,, | Performed by: NURSE PRACTITIONER

## 2025-04-01 PROCEDURE — 3075F SYST BP GE 130 - 139MM HG: CPT | Mod: CPTII,S$GLB,, | Performed by: NURSE PRACTITIONER

## 2025-04-01 PROCEDURE — 3008F BODY MASS INDEX DOCD: CPT | Mod: CPTII,S$GLB,, | Performed by: NURSE PRACTITIONER

## 2025-04-01 PROCEDURE — 1160F RVW MEDS BY RX/DR IN RCRD: CPT | Mod: CPTII,S$GLB,, | Performed by: NURSE PRACTITIONER

## 2025-04-01 PROCEDURE — 1159F MED LIST DOCD IN RCRD: CPT | Mod: CPTII,S$GLB,, | Performed by: NURSE PRACTITIONER

## 2025-04-01 PROCEDURE — 3079F DIAST BP 80-89 MM HG: CPT | Mod: CPTII,S$GLB,, | Performed by: NURSE PRACTITIONER

## 2025-04-01 PROCEDURE — 4010F ACE/ARB THERAPY RXD/TAKEN: CPT | Mod: CPTII,S$GLB,, | Performed by: NURSE PRACTITIONER

## 2025-04-01 RX ORDER — TRAZODONE HYDROCHLORIDE 50 MG/1
75 TABLET ORAL NIGHTLY PRN
Qty: 135 TABLET | Refills: 0 | Status: SHIPPED | OUTPATIENT
Start: 2025-04-01

## 2025-04-01 RX ORDER — VALSARTAN 320 MG/1
320 TABLET ORAL DAILY
Qty: 90 TABLET | Refills: 3 | Status: SHIPPED | OUTPATIENT
Start: 2025-04-01 | End: 2026-03-27

## 2025-04-01 RX ORDER — ESCITALOPRAM OXALATE 20 MG/1
20 TABLET ORAL DAILY
Qty: 90 TABLET | Refills: 0 | Status: SHIPPED | OUTPATIENT
Start: 2025-04-01

## 2025-04-01 NOTE — PROGRESS NOTES
THIS DOCUMENT WAS MADE IN PART WITH VOICE RECOGNITION SOFTWARE.  OCCASIONALLY THIS SOFTWARE WILL MISINTERPRET WORDS OR PHRASES.     Assessment and Plan:    Essential hypertension  Comments:  controlled   continue Diovan   limit sodium intake   monitor BP  6mth follow up  Orders:  -     valsartan (DIOVAN) 320 MG tablet; Take 1 tablet (320 mg total) by mouth once daily.  Dispense: 90 tablet; Refill: 3  -     CBC Without Differential; Future; Expected date: 04/01/2025  -     Comprehensive Metabolic Panel; Future; Expected date: 04/01/2025  -     Lipid Panel; Future; Expected date: 04/01/2025  -     TSH; Future; Expected date: 04/01/2025    Primary insomnia  Comments:  Symptoms controlled  Continue trazodone as needed  Orders:  -     traZODone (DESYREL) 50 MG tablet; Take 1.5 tablets (75 mg total) by mouth nightly as needed for Insomnia.  Dispense: 135 tablet; Refill: 0    Generalized anxiety disorder  Comments:  Symptoms controlled  Continue Lexapro  6mth follow up  Orders:  -     traZODone (DESYREL) 50 MG tablet; Take 1.5 tablets (75 mg total) by mouth nightly as needed for Insomnia.  Dispense: 135 tablet; Refill: 0  -     EScitalopram oxalate (LEXAPRO) 20 MG tablet; Take 1 tablet (20 mg total) by mouth once daily.  Dispense: 90 tablet; Refill: 0  -     CBC Without Differential; Future; Expected date: 04/01/2025  -     Comprehensive Metabolic Panel; Future; Expected date: 04/01/2025  -     TSH; Future; Expected date: 04/01/2025    Other hyperlipidemia  Comments:  work on diet  Orders:  -     Lipid Panel; Future; Expected date: 04/01/2025    Screening for diabetes mellitus  -     Hemoglobin A1C; Future; Expected date: 04/01/2025             Visit summary:    Chronic conditions stable    Medications refilled    Continue to work on regular exercise, maintain healthy weight, balanced diet. Avoid unhealthy habits: smoking, excessive alcohol intake.    Follow up in about 6 months (around 10/1/2025) for Follow-up with PCP.  "  ______________________________________________________________________  Subjective:    Chief Complaint:  Follow up chronic medical conditions.    HPI:  Jcarlos is a 59 y.o. year old male with a past medical history noted below, here to follow up for medication refills.       Hypertension:  Controlled, valsartan 320 mg daily    Anxiety:  Better controlled since increasing dose.  Taking  Lexapro 20 mg nightly    Insomnia:   controlled,  takes trazodone 75 mg as needed      Medications:  Medications Ordered Prior to Encounter[1]    Review of Systems:  Review of Systems   Constitutional:  Negative for chills, fatigue and fever.   HENT:  Negative for congestion and rhinorrhea.    Respiratory:  Negative for cough and shortness of breath.    Cardiovascular:  Negative for chest pain, palpitations and leg swelling.   Gastrointestinal:  Negative for diarrhea, nausea and vomiting.   Skin:  Negative for rash.   Neurological:  Negative for dizziness, weakness, light-headedness and headaches.   Psychiatric/Behavioral:  Negative for sleep disturbance.        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   Constitutional:       General: He is not in acute distress.     Appearance: He is obese.   HENT:      Head: Normocephalic and atraumatic.      Mouth/Throat:      Mouth: Mucous membranes are moist.   Eyes:      General: No scleral icterus.     Conjunctiva/sclera: Conjunctivae normal.   Cardiovascular:      Rate and Rhythm: Normal rate and regular rhythm.   Pulmonary:      Effort: Pulmonary effort is normal. No respiratory distress.      Breath sounds: Normal breath " sounds.   Musculoskeletal:      Right lower leg: No edema.      Left lower leg: No edema.   Skin:     General: Skin is warm and dry.   Neurological:      Mental Status: He is alert and oriented to person, place, and time.   Psychiatric:         Mood and Affect: Mood normal.         Behavior: Behavior normal.         Thought Content: Thought content normal.         Data:    The 10-year ASCVD risk score (Leonides HIGGINBOTHAM, et al., 2019) is: 7.6%    Values used to calculate the score:      Age: 59 years      Sex: Male      Is Non- : No      Diabetic: No      Tobacco smoker: No      Systolic Blood Pressure: 132 mmHg      Is BP treated: Yes      HDL Cholesterol: 47 mg/dL      Total Cholesterol: 147 mg/dL     CMP  Sodium   Date Value Ref Range Status   10/21/2024 136 136 - 145 mmol/L Final     Potassium   Date Value Ref Range Status   10/21/2024 3.9 3.5 - 5.1 mmol/L Final     Comment:     Anion Gap reference range revised on 4/28/2023     Chloride   Date Value Ref Range Status   10/21/2024 104 95 - 110 mmol/L Final     CO2   Date Value Ref Range Status   10/21/2024 22 22 - 31 mmol/L Final     Glucose   Date Value Ref Range Status   10/21/2024 101 70 - 110 mg/dL Final     Comment:     The ADA recommends the following guidelines for fasting glucose:    Normal:       less than 100 mg/dL    Prediabetes:  100 mg/dL to 125 mg/dL    Diabetes:     126 mg/dL or higher       BUN   Date Value Ref Range Status   10/21/2024 21 9 - 21 mg/dL Final     Creatinine   Date Value Ref Range Status   10/21/2024 0.86 0.50 - 1.40 mg/dL Final     Calcium   Date Value Ref Range Status   10/21/2024 9.0 8.4 - 10.2 mg/dL Final     Total Protein   Date Value Ref Range Status   10/21/2024 7.2 6.0 - 8.4 g/dL Final     Albumin   Date Value Ref Range Status   10/21/2024 4.4 3.5 - 5.2 g/dL Final     Total Bilirubin   Date Value Ref Range Status   10/21/2024 2.0 (H) 0.2 - 1.3 mg/dL Final     Alkaline Phosphatase   Date Value Ref Range Status    10/21/2024 62 38 - 145 U/L Final     AST   Date Value Ref Range Status   10/21/2024 21 17 - 59 U/L Final     ALT   Date Value Ref Range Status   10/21/2024 18 0 - 50 U/L Final     Anion Gap   Date Value Ref Range Status   10/21/2024 10 5 - 12 mmol/L Final     Comment:     Anion Gap reference range revised on 4/28/2023     eGFR   Date Value Ref Range Status   10/21/2024 >60 >60 mL/min/1.73 m^2 Final         Medical history reviewed, Medications reconciled.              Daisy Berg, FNP-C  Family Medicine         [1]   Current Outpatient Medications on File Prior to Visit   Medication Sig Dispense Refill    aspirin (ECOTRIN) 81 MG EC tablet Take 81 mg by mouth once daily.      [DISCONTINUED] EScitalopram oxalate (LEXAPRO) 20 MG tablet Take 1 tablet (20 mg total) by mouth once daily. 90 tablet 0    [DISCONTINUED] traZODone (DESYREL) 50 MG tablet TAKE 1.5 TABLETS (75 MG TOTAL) BY MOUTH NIGHTLY AS NEEDED FOR INSOMNIA. 135 tablet 0    [DISCONTINUED] valsartan (DIOVAN) 320 MG tablet Take 1 tablet (320 mg total) by mouth once daily. 90 tablet 3    ondansetron (ZOFRAN-ODT) 4 MG TbDL Take 1 tablet (4 mg total) by mouth every 8 (eight) hours as needed. (Patient not taking: Reported on 4/1/2025) 18 tablet 0     No current facility-administered medications on file prior to visit.

## 2025-04-01 NOTE — LETTER
April 1, 2025      [facility]  [address] [place] [address]  Phone: [phone]  Fax: [phone]       Patient: Jcarlos Rebolledo   YOB: 1965  Date of Visit: 04/01/2025    To Whom It May Concern:    Elsa Rebolledo  was at [facility] on 04/01/2025. The patient may return to work on 04/01/2025 with no restrictions. If you have any questions or concerns, or if I can be of further assistance, please do not hesitate to contact me.    Sincerely,    Bridget Gaytan MA

## 2025-06-09 ENCOUNTER — LAB VISIT (OUTPATIENT)
Dept: LAB | Facility: HOSPITAL | Age: 60
End: 2025-06-09
Payer: COMMERCIAL

## 2025-06-09 DIAGNOSIS — F41.1 GENERALIZED ANXIETY DISORDER: ICD-10-CM

## 2025-06-09 DIAGNOSIS — I10 ESSENTIAL HYPERTENSION: ICD-10-CM

## 2025-06-09 DIAGNOSIS — E78.49 OTHER HYPERLIPIDEMIA: ICD-10-CM

## 2025-06-09 DIAGNOSIS — Z13.1 SCREENING FOR DIABETES MELLITUS: ICD-10-CM

## 2025-06-09 LAB
ALBUMIN SERPL BCP-MCNC: 4.3 G/DL (ref 3.5–5.2)
ALP SERPL-CCNC: 63 UNIT/L (ref 40–150)
ALT SERPL W/O P-5'-P-CCNC: 19 UNIT/L (ref 10–44)
ANION GAP (OHS): 14 MMOL/L (ref 8–16)
AST SERPL-CCNC: 18 UNIT/L (ref 11–45)
BILIRUB SERPL-MCNC: 1 MG/DL (ref 0.1–1)
BUN SERPL-MCNC: 20 MG/DL (ref 6–20)
CALCIUM SERPL-MCNC: 8.8 MG/DL (ref 8.7–10.5)
CHLORIDE SERPL-SCNC: 107 MMOL/L (ref 95–110)
CHOLEST SERPL-MCNC: 145 MG/DL (ref 120–199)
CHOLEST/HDLC SERPL: 3 {RATIO} (ref 2–5)
CO2 SERPL-SCNC: 21 MMOL/L (ref 23–29)
CREAT SERPL-MCNC: 1 MG/DL (ref 0.5–1.4)
EAG (OHS): 108 MG/DL (ref 68–131)
ERYTHROCYTE [DISTWIDTH] IN BLOOD BY AUTOMATED COUNT: 13.2 % (ref 11.5–14.5)
GFR SERPLBLD CREATININE-BSD FMLA CKD-EPI: >60 ML/MIN/1.73/M2
GLUCOSE SERPL-MCNC: 97 MG/DL (ref 70–110)
HBA1C MFR BLD: 5.4 % (ref 4–5.6)
HCT VFR BLD AUTO: 46.5 % (ref 40–54)
HDLC SERPL-MCNC: 48 MG/DL (ref 40–75)
HDLC SERPL: 33.1 % (ref 20–50)
HGB BLD-MCNC: 15.3 GM/DL (ref 14–18)
LDLC SERPL CALC-MCNC: 83.4 MG/DL (ref 63–159)
MCH RBC QN AUTO: 29.9 PG (ref 27–31)
MCHC RBC AUTO-ENTMCNC: 32.9 G/DL (ref 32–36)
MCV RBC AUTO: 91 FL (ref 82–98)
NONHDLC SERPL-MCNC: 97 MG/DL
PLATELET # BLD AUTO: 261 K/UL (ref 150–450)
PMV BLD AUTO: 11.2 FL (ref 9.2–12.9)
POTASSIUM SERPL-SCNC: 4.3 MMOL/L (ref 3.5–5.1)
PROT SERPL-MCNC: 7.1 GM/DL (ref 6–8.4)
RBC # BLD AUTO: 5.11 M/UL (ref 4.6–6.2)
SODIUM SERPL-SCNC: 142 MMOL/L (ref 136–145)
TRIGL SERPL-MCNC: 68 MG/DL (ref 30–150)
TSH SERPL-ACNC: 1.38 UIU/ML (ref 0.4–4)
WBC # BLD AUTO: 5.49 K/UL (ref 3.9–12.7)

## 2025-06-09 PROCEDURE — 80061 LIPID PANEL: CPT

## 2025-06-09 PROCEDURE — 80053 COMPREHEN METABOLIC PANEL: CPT

## 2025-06-09 PROCEDURE — 85027 COMPLETE CBC AUTOMATED: CPT

## 2025-06-09 PROCEDURE — 83036 HEMOGLOBIN GLYCOSYLATED A1C: CPT

## 2025-06-09 PROCEDURE — 36415 COLL VENOUS BLD VENIPUNCTURE: CPT | Mod: PO

## 2025-06-09 PROCEDURE — 84443 ASSAY THYROID STIM HORMONE: CPT

## 2025-06-10 ENCOUNTER — RESULTS FOLLOW-UP (OUTPATIENT)
Dept: FAMILY MEDICINE | Facility: CLINIC | Age: 60
End: 2025-06-10

## 2025-06-23 DIAGNOSIS — F51.01 PRIMARY INSOMNIA: Chronic | ICD-10-CM

## 2025-06-23 DIAGNOSIS — F41.1 GENERALIZED ANXIETY DISORDER: ICD-10-CM

## 2025-06-23 RX ORDER — TRAZODONE HYDROCHLORIDE 50 MG/1
TABLET ORAL
Qty: 135 TABLET | Refills: 0 | Status: SHIPPED | OUTPATIENT
Start: 2025-06-23